# Patient Record
Sex: FEMALE | Race: BLACK OR AFRICAN AMERICAN | NOT HISPANIC OR LATINO | Employment: UNEMPLOYED | URBAN - METROPOLITAN AREA
[De-identification: names, ages, dates, MRNs, and addresses within clinical notes are randomized per-mention and may not be internally consistent; named-entity substitution may affect disease eponyms.]

---

## 2024-01-02 ENCOUNTER — APPOINTMENT (OUTPATIENT)
Dept: ORTHOPEDIC SURGERY | Facility: CLINIC | Age: 59
End: 2024-01-02
Payer: MEDICARE

## 2024-01-02 NOTE — PROGRESS NOTES
CHIEF COMPLAINT: No chief complaint on file.    History: 58 y.o. female presents to the office today for evaluation of her right shoulder.  The patient is right-hand dominant.  The patient is on disability due to chronic leg and back issues.  She does have a history of a cervical fracture and underwent cervical fusion about 2 years ago.  She says she has very limited range of motion in her neck.  Unfortunately, few months ago she started having pain in the right shoulder.  She says that there is pain in the right shoulder but also numbness and tingling that radiates from the neck all the way down to the fingers.  Pain in the shoulder is primarily anterior lateral.  She has had no specific treatment for the shoulder pain yet.  She says that she has had her neck evaluated and states that she had a recent CT scan that showed loosening of the hardware in her neck.  She is not scheduled to see a cervical provider yet.    Past medical history, past surgical history, medications, allergies, family history, social history, and review of systems were reviewed today.    A 12 point review of systems was negative other than as stated in the HPI.    Past Medical History:   Diagnosis Date    Calculus of gallbladder without cholecystitis without obstruction     Gallstones    Morbid (severe) obesity due to excess calories (CMS/[facility]) 02/07/2018    Morbid obesity with BMI of 40.0-44.9, adult        Not on File     Past Surgical History:   Procedure Laterality Date    APPENDECTOMY  03/07/2017    Appendectomy    CHOLECYSTECTOMY  03/07/2017    Cholecystectomy    CT ANGIO NECK  1/31/2021    CT ANGIO NECK 1/31/2021    GASTRIC BYPASS  01/24/2018    Gastric Surgery For Morbid Obesity Bypass With Yasmany-en-Y    MR NECK ANGIO WO IV CONTRAST  2/1/2021    MR NECK ANGIO WO IV CONTRAST 2/1/2021 [facility] CLINICAL LEGACY    OTHER SURGICAL HISTORY  02/23/2021    Cervical vertebral fusion    OTHER SURGICAL HISTORY  01/03/2018    Incision And Drainage Of  "Skin Abscess, Simple    TOTAL KNEE ARTHROPLASTY  03/07/2017    Knee Replacement        No family history on file.     Social History     Socioeconomic History    Marital status:      Spouse name: Not on file    Number of children: Not on file    Years of education: Not on file    Highest education level: Not on file   Occupational History    Not on file   Tobacco Use    Smoking status: Not on file    Smokeless tobacco: Not on file   Substance and Sexual Activity    Alcohol use: Not on file    Drug use: Not on file    Sexual activity: Not on file   Other Topics Concern    Not on file   Social History Narrative    Not on file     Social Determinants of Health     Financial Resource Strain: Not on file   Food Insecurity: Not on file   Transportation Needs: Not on file   Physical Activity: Not on file   Stress: Not on file   Social Connections: Not on file   Intimate Partner Violence: Not on file   Housing Stability: Not on file        CURRENT MEDICATIONS:   No current outpatient medications on file.     No current facility-administered medications for this visit.       Physical Examination:      2/23/2021    11:36 AM 3/23/2021    10:12 AM 8/5/2021     9:14 AM 8/5/2021     9:17 AM 8/18/2021    10:33 AM 2/15/2022     4:23 PM   Vitals   Systolic  133  135 126 144   Diastolic  85  86 79 91   Heart Rate 101 98  86  81   Resp 18  16  89 16   Height (in) 1.575 m (5' 2\") 1.575 m (5' 2\") 1.575 m (5' 2\")   1.575 m (5' 2\")   Weight (lb) 178 190 160   199   BMI 32.56 kg/m2 34.75 kg/m2 29.26 kg/m2   36.4 kg/m2   BSA (m2) 1.88 m2 1.94 m2 1.78 m2   1.99 m2      There is no height or weight on file to calculate BMI.    Well-appearing, appears stated age, pleasant and cooperative, appropriate mood and behavior. Height and weight reviewed. Alert and oriented x3.  Auditory function intact.  No acute distress.  Intact ocular function, MAGNOLIA, EOMI. Breathing is unlabored .  There is no evidence of jugular venous distension. Skin " appearance is normal without evidence of rash or other lesions. 2+ radial pulses bilaterally, fingers pink and wwp, good capillary refill, no pitting edema. No appreciable lymphadenopathy in bilateral upper extremities. SILT throughout both upper extremities, median/radial/ulnar/musculocutaneous/axillary nerve motor and sensory intact (except for abnormalities noted in focused musculoskeletal exam section below).     Neck exam: Tenderness palpation in the paracervical area.  Extremely limited neck range of motion with essentially no rotational ability and very limited flexion and extension.    On exam of bilateral upper extremities, she has very limited range of motion and strength of the right shoulder compared to the left.  On the right side she is active forward flexion to 90, external rotation to 30, internal rotation to the side.  On the left she is active forward flexion 160, external rotation 60, internal rotation to T12.  Passively I can achieve full range of motion but is very painful on the right side.  She does have pain with passive maneuvers of the right shoulder with Neer and Diaz maneuvers.  Significant weakness of rotator cuff strength on the right side, 4-5 strength with resisted supraspinatus and resisted external rotation testing.  She also has weakness in the elbow wrist and fingers on the right side as well and endorses numbness and tingling throughout the extremity.    Imaging: Radiographs of the right shoulder performed today.  Personally interpreted by myself.  She does have chronic changes of the greater tuberosity and otherwise preserved glenohumeral joint space.    Assessment: Right rotator cuff tendinitis, cervical spine pain    Plan: I had a very long discussion the patient with treatment options diagnosis.  Based on my examination today, I do think that she likely has 2 issues that are ongoing.  First, I do think she has true shoulder issues, she has pain with passive maneuvers of  the right shoulder.  We discussed the treatment options of rotator cuff tendinitis.  Will start with a cortisone injection.  In terms of the numbness and tingling and weakness that she has in the entirety of the right upper extremity, this cannot be explained by the shoulder issues.  Deep think that this is more related to her neck.  We will have her set up to see one of our cervical spine specialist.  I did state that the injection for her shoulder replaced therapeutic and diagnostic role.  If it helps a lot, then we can say for more certainty that the majority of her pain is coming from her shoulder.  If the shoulder injection does not help, then most of her pain is likely coming from her neck.    Injection was performed, please see separate procedure note, patient tolerated well.     Patient ID: Caron Hobson is a 58 y.o. female.    L Inj/Asp: R subacromial bursa on 1/3/2024 12:06 PM  Indications: pain  Details: 22 G needle, posterior approach  Medications: 80 mg triamcinolone acetonide 40 mg/mL; 5 mL lidocaine 10 mg/mL (1 %)  Outcome: tolerated well, no immediate complications  Procedure, treatment alternatives, risks and benefits explained, specific risks discussed. Consent was given by the patient. Immediately prior to procedure a time out was called to verify the correct patient, procedure, equipment, support staff and site/side marked as required. Patient was prepped and draped in the usual sterile fashion.           Dragon software was used to dictate this note, please be aware that minor errors in transcription may be present.    Pro Fischer MD    Shoulder/Elbow Surgery  [facility]

## 2024-01-03 ENCOUNTER — OFFICE VISIT (OUTPATIENT)
Dept: ORTHOPEDIC SURGERY | Facility: CLINIC | Age: 59
End: 2024-01-03
Payer: MEDICARE

## 2024-01-03 ENCOUNTER — ANCILLARY PROCEDURE (OUTPATIENT)
Dept: RADIOLOGY | Facility: CLINIC | Age: 59
End: 2024-01-03
Payer: MEDICARE

## 2024-01-03 DIAGNOSIS — M25.511 RIGHT SHOULDER PAIN, UNSPECIFIED CHRONICITY: ICD-10-CM

## 2024-01-03 DIAGNOSIS — M67.911 DISORDER OF RIGHT ROTATOR CUFF: Primary | ICD-10-CM

## 2024-01-03 PROCEDURE — 99214 OFFICE O/P EST MOD 30 MIN: CPT | Performed by: STUDENT IN AN ORGANIZED HEALTH CARE EDUCATION/TRAINING PROGRAM

## 2024-01-03 PROCEDURE — 73030 X-RAY EXAM OF SHOULDER: CPT | Mod: RT

## 2024-01-03 PROCEDURE — 2500000005 HC RX 250 GENERAL PHARMACY W/O HCPCS: Performed by: STUDENT IN AN ORGANIZED HEALTH CARE EDUCATION/TRAINING PROGRAM

## 2024-01-03 PROCEDURE — 73030 X-RAY EXAM OF SHOULDER: CPT | Mod: RIGHT SIDE | Performed by: RADIOLOGY

## 2024-01-03 PROCEDURE — 20610 DRAIN/INJ JOINT/BURSA W/O US: CPT | Performed by: STUDENT IN AN ORGANIZED HEALTH CARE EDUCATION/TRAINING PROGRAM

## 2024-01-03 PROCEDURE — 2500000004 HC RX 250 GENERAL PHARMACY W/ HCPCS (ALT 636 FOR OP/ED): Performed by: STUDENT IN AN ORGANIZED HEALTH CARE EDUCATION/TRAINING PROGRAM

## 2024-01-03 PROCEDURE — 99204 OFFICE O/P NEW MOD 45 MIN: CPT | Performed by: STUDENT IN AN ORGANIZED HEALTH CARE EDUCATION/TRAINING PROGRAM

## 2024-01-03 RX ORDER — TRIAMCINOLONE ACETONIDE 40 MG/ML
80 INJECTION, SUSPENSION INTRA-ARTICULAR; INTRAMUSCULAR
Status: COMPLETED | OUTPATIENT
Start: 2024-01-03 | End: 2024-01-03

## 2024-01-03 RX ORDER — LIDOCAINE HYDROCHLORIDE 10 MG/ML
5 INJECTION INFILTRATION; PERINEURAL
Status: COMPLETED | OUTPATIENT
Start: 2024-01-03 | End: 2024-01-03

## 2024-01-03 RX ADMIN — TRIAMCINOLONE ACETONIDE 80 MG: 400 INJECTION, SUSPENSION INTRA-ARTICULAR; INTRAMUSCULAR at 12:06

## 2024-01-03 RX ADMIN — LIDOCAINE HYDROCHLORIDE 5 ML: 10 INJECTION, SOLUTION INFILTRATION; PERINEURAL at 12:06

## 2024-01-11 ENCOUNTER — OFFICE VISIT (OUTPATIENT)
Dept: ORTHOPEDIC SURGERY | Facility: CLINIC | Age: 59
End: 2024-01-11
Payer: MEDICARE

## 2024-01-11 DIAGNOSIS — M54.12 CERVICAL MYELOPATHY WITH CERVICAL RADICULOPATHY (MULTI): Primary | ICD-10-CM

## 2024-01-11 DIAGNOSIS — G95.9 CERVICAL MYELOPATHY WITH CERVICAL RADICULOPATHY (MULTI): Primary | ICD-10-CM

## 2024-01-11 PROCEDURE — 1036F TOBACCO NON-USER: CPT | Performed by: PHYSICIAN ASSISTANT

## 2024-01-11 PROCEDURE — 99214 OFFICE O/P EST MOD 30 MIN: CPT | Mod: ZK | Performed by: PHYSICIAN ASSISTANT

## 2024-01-11 PROCEDURE — 99214 OFFICE O/P EST MOD 30 MIN: CPT | Performed by: PHYSICIAN ASSISTANT

## 2024-01-11 RX ORDER — SEMAGLUTIDE 0.68 MG/ML
INJECTION, SOLUTION SUBCUTANEOUS
COMMUNITY
Start: 2023-11-29

## 2024-01-11 RX ORDER — FLUTICASONE PROPIONATE AND SALMETEROL 50; 250 UG/1; UG/1
POWDER RESPIRATORY (INHALATION)
COMMUNITY
Start: 2022-08-30

## 2024-01-11 RX ORDER — ASPIRIN 325 MG
TABLET, DELAYED RELEASE (ENTERIC COATED) ORAL
COMMUNITY
Start: 2017-08-01

## 2024-01-11 RX ORDER — ALBUTEROL SULFATE 90 UG/1
AEROSOL, METERED RESPIRATORY (INHALATION)
COMMUNITY

## 2024-01-11 ASSESSMENT — PAIN SCALES - GENERAL: PAINLEVEL_OUTOF10: 9

## 2024-01-11 ASSESSMENT — PAIN - FUNCTIONAL ASSESSMENT: PAIN_FUNCTIONAL_ASSESSMENT: 0-10
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
## 2024-01-15 ENCOUNTER — HOSPITAL ENCOUNTER (OUTPATIENT)
Dept: RADIOLOGY | Facility: HOSPITAL | Age: 59
Discharge: HOME | End: 2024-01-15
Payer: MEDICARE

## 2024-01-15 DIAGNOSIS — M54.12 CERVICAL MYELOPATHY WITH CERVICAL RADICULOPATHY (MULTI): ICD-10-CM

## 2024-01-15 DIAGNOSIS — G95.9 CERVICAL MYELOPATHY WITH CERVICAL RADICULOPATHY (MULTI): ICD-10-CM

## 2024-01-15 PROCEDURE — 72141 MRI NECK SPINE W/O DYE: CPT | Performed by: RADIOLOGY

## 2024-01-15 PROCEDURE — 72141 MRI NECK SPINE W/O DYE: CPT

## 2024-01-22 ENCOUNTER — HOSPITAL ENCOUNTER (OUTPATIENT)
Dept: RADIOLOGY | Facility: CLINIC | Age: 59
Discharge: HOME | End: 2024-01-22
Payer: MEDICARE

## 2024-01-22 ENCOUNTER — APPOINTMENT (OUTPATIENT)
Dept: ORTHOPEDIC SURGERY | Facility: CLINIC | Age: 59
End: 2024-01-22
Payer: COMMERCIAL

## 2024-01-22 ENCOUNTER — OFFICE VISIT (OUTPATIENT)
Dept: ORTHOPEDIC SURGERY | Facility: CLINIC | Age: 59
End: 2024-01-22
Payer: MEDICARE

## 2024-01-22 VITALS — HEIGHT: 61 IN | BODY MASS INDEX: 35.87 KG/M2 | WEIGHT: 190 LBS

## 2024-01-22 DIAGNOSIS — G95.9 CERVICAL MYELOPATHY WITH CERVICAL RADICULOPATHY (MULTI): ICD-10-CM

## 2024-01-22 DIAGNOSIS — M54.12 CERVICAL MYELOPATHY WITH CERVICAL RADICULOPATHY (MULTI): ICD-10-CM

## 2024-01-22 DIAGNOSIS — M54.12 CERVICAL RADICULOPATHY: ICD-10-CM

## 2024-01-22 PROCEDURE — 72050 X-RAY EXAM NECK SPINE 4/5VWS: CPT

## 2024-01-22 PROCEDURE — 72050 X-RAY EXAM NECK SPINE 4/5VWS: CPT | Performed by: RADIOLOGY

## 2024-01-22 PROCEDURE — 99214 OFFICE O/P EST MOD 30 MIN: CPT

## 2024-01-22 PROCEDURE — 1036F TOBACCO NON-USER: CPT

## 2024-01-22 RX ORDER — PREDNISONE 20 MG/1
TABLET ORAL
Qty: 30 TABLET | Refills: 0 | Status: SHIPPED | OUTPATIENT
Start: 2024-01-22 | End: 2024-02-06

## 2024-01-22 RX ORDER — GABAPENTIN 300 MG/1
300 CAPSULE ORAL 2 TIMES DAILY
Qty: 60 CAPSULE | Refills: 2 | Status: SHIPPED | OUTPATIENT
Start: 2024-01-22 | End: 2024-04-21

## 2024-01-22 ASSESSMENT — PAIN SCALES - GENERAL: PAINLEVEL: 10-WORST PAIN EVER
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
## 2024-01-25 ENCOUNTER — OFFICE VISIT (OUTPATIENT)
Dept: PAIN MEDICINE | Facility: HOSPITAL | Age: 59
End: 2024-01-25
Payer: MEDICARE

## 2024-01-25 DIAGNOSIS — M54.12 CERVICAL MYELOPATHY WITH CERVICAL RADICULOPATHY (MULTI): ICD-10-CM

## 2024-01-25 DIAGNOSIS — G95.9 CERVICAL MYELOPATHY WITH CERVICAL RADICULOPATHY (MULTI): ICD-10-CM

## 2024-01-25 PROCEDURE — 99214 OFFICE O/P EST MOD 30 MIN: CPT | Performed by: ANESTHESIOLOGY

## 2024-01-25 PROCEDURE — 1036F TOBACCO NON-USER: CPT | Performed by: ANESTHESIOLOGY

## 2024-01-25 ASSESSMENT — PAIN SCALES - GENERAL: PAINLEVEL: 8

## 2024-01-25 NOTE — PROGRESS NOTES
"Chief Complaint   Patient presents with    Arm Pain     Subjective   Patient ID: Caron Hobson is a 58 y.o. female who presents for No chief complaint on file..    History of present illness:  58-year-old female with a PMH of  cervical fusion C1-C4 in 2021. Presents after fall in November causing sharp/radiating R arm/shoulder pain. Pain began a few days following fall and has progressively worsened. Pain begins in shoulder and radiates down arm into fingers. The patient is unable to localize pain any further stating it's her entire arm and shoulder. She has no left sided symptoms. She has right sided neck \"tightness\", but this is chronic for her and has had no recent changes. Neither neck or shoulder are painful to palpation. Her symptoms are worse at night and with any movement. Unable to extend arm behind back 2/2 pain. Pain is associated with occasional numbness and tingling. Patient also endorses weakness and dropping objects. 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     Patient was recently evaluated by ortho for the same complaint. At that time, they recommended PT and prescribed gabapentin and prednisone. She has also received a subacromial bursa injection, which offered no relief.  She has been doing exercises directed at her neck, shoulder and arm several times a week which are physician directed for the past 1 month.      MRI C spine 1/2024: Minimal anterolisthesis is noted at C6-7 and C7-T1. C4-5: Disc bulge minimally indents the thecal sac more so than noted previously. C5-6: The spinal canal is moderately stenosed centrally by disc protrusion      Review of Systems   13 point ROS negative except as mentioned in HPI.     Current Outpatient Medications   Medication Instructions    Advair Diskus 250-50 mcg/dose diskus inhaler     albuterol 90 mcg/actuation inhaler     cholecalciferol (Vitamin D-3) 50,000 unit capsule oral    gabapentin (NEURONTIN) 300 " mg, oral, 2 times daily    Ozempic 0.25 mg or 0.5 mg (2 mg/3 mL) pen injector INJECT 0.25MG UNDER THE SKIN ONCE WEEKLY    predniSONE (Deltasone) 20 mg tablet Take 1 tablet (20 mg) by mouth 3 times a day for 5 days, THEN 1 tablet (20 mg) 2 times a day for 5 days, THEN 1 tablet (20 mg) once daily for 5 days.        Past Medical History:   Diagnosis Date    Calculus of gallbladder without cholecystitis without obstruction     Gallstones    Morbid (severe) obesity due to excess calories (CMS/[facility]) 02/07/2018    Morbid obesity with BMI of 40.0-44.9, adult        Past Surgical History:   Procedure Laterality Date    APPENDECTOMY  03/07/2017    Appendectomy    CHOLECYSTECTOMY  03/07/2017    Cholecystectomy    CT ANGIO NECK  1/31/2021    CT ANGIO NECK 1/31/2021    GASTRIC BYPASS  01/24/2018    Gastric Surgery For Morbid Obesity Bypass With Yasmany-en-Y    MR NECK ANGIO WO IV CONTRAST  2/1/2021    MR NECK ANGIO WO IV CONTRAST 2/1/2021 [facility] CLINICAL LEGACY    OTHER SURGICAL HISTORY  02/23/2021    Cervical vertebral fusion    OTHER SURGICAL HISTORY  01/03/2018    Incision And Drainage Of Skin Abscess, Simple    TOTAL KNEE ARTHROPLASTY  03/07/2017    Knee Replacement        No family history on file.     Allergies   Allergen Reactions    Coconut GI Upset and Hives        Objective     Physical Exam  GENERAL EXAM  General Appearance:  Awake, alert, healthy appearing, well developed, No acute distress.  Head: Normocephalic without evidence of head injury.  Neck: The appearance of the neck was normal without swelling with a midline trachea.  Eyes: The eyelids and eyebrows exhibited no abnormalities.  Pupils were not pin-point.  Sclera was without icterus.  Lungs: Respiration rhythm and depth was normal.  Respiratory movements were normal without labored breathing.  Cardiovascular: No peripheral edema was present.    Neurological: Patient was oriented to time, place, and person.  Speech was normal.  Balance, gait, and stance were  unremarkable.  3+ patellar, biceps, brachioradialis reflexes BL. Gucci neg.   Ext: R: 4/5  strength, shoulder & elbow flexion, extension. L: 5/5  strength, shoulder & elbow flexion, extension.  Psychiatric: Appearance was normal with appropriate dress.  Mood was euthymic and affect was normal.  Skin: Affected regions were without ecchymosis or skin lesions.      Assessment/Plan   57 y/o F with PMH of cervical fusion C1-C4 in 2021 with cervical radiculitis occurring after fall in November. Pain radiates from shoulder, down arm, into all fingers. Associated with numbness, tingling, and weakness, causing patient difficulty in completing ADLs. Physical exam significant for decreased strength in RUE compared to left, weakness in dominant side. MRI notable degenerative changes in the cervical spine most notably at C4-5 and C5-6 with moderate central stenosis.  Patient has tried gabapentin, prednisone, physician directed exercises x 1 month for her neck/shoulder/arm, and a subacromial bursa injection with no relief.  Further formal PT is ordered for February.    Symptoms are severe and making it unable for the patient to work.    Plan  - Plan for C6-7 cervical CHARLEY. Procedure including risk, benefits and side effects were explained to pt who was agreeable to proceed.   - Continue gabapentin 300 mg BID.  Side effect profile and risk reviewed.  - Continue PT.

## 2024-01-30 ENCOUNTER — HOSPITAL ENCOUNTER (EMERGENCY)
Facility: HOSPITAL | Age: 59
Discharge: HOME | End: 2024-01-30
Attending: EMERGENCY MEDICINE
Payer: MEDICARE

## 2024-01-30 ENCOUNTER — APPOINTMENT (OUTPATIENT)
Dept: RADIOLOGY | Facility: HOSPITAL | Age: 59
End: 2024-01-30
Payer: MEDICARE

## 2024-01-30 VITALS
WEIGHT: 190 LBS | SYSTOLIC BLOOD PRESSURE: 123 MMHG | TEMPERATURE: 98.1 F | OXYGEN SATURATION: 98 % | HEIGHT: 61 IN | DIASTOLIC BLOOD PRESSURE: 83 MMHG | BODY MASS INDEX: 35.87 KG/M2 | RESPIRATION RATE: 18 BRPM | HEART RATE: 85 BPM

## 2024-01-30 DIAGNOSIS — M25.511 CHRONIC RIGHT SHOULDER PAIN: Primary | ICD-10-CM

## 2024-01-30 DIAGNOSIS — G89.29 CHRONIC RIGHT SHOULDER PAIN: Primary | ICD-10-CM

## 2024-01-30 PROCEDURE — 99283 EMERGENCY DEPT VISIT LOW MDM: CPT | Mod: 25

## 2024-01-30 PROCEDURE — 99284 EMERGENCY DEPT VISIT MOD MDM: CPT | Performed by: EMERGENCY MEDICINE

## 2024-01-30 PROCEDURE — 73030 X-RAY EXAM OF SHOULDER: CPT | Mod: RIGHT SIDE | Performed by: RADIOLOGY

## 2024-01-30 PROCEDURE — 96372 THER/PROPH/DIAG INJ SC/IM: CPT

## 2024-01-30 PROCEDURE — 2500000004 HC RX 250 GENERAL PHARMACY W/ HCPCS (ALT 636 FOR OP/ED): Mod: SE

## 2024-01-30 PROCEDURE — 73030 X-RAY EXAM OF SHOULDER: CPT | Mod: RT

## 2024-01-30 PROCEDURE — 2500000001 HC RX 250 WO HCPCS SELF ADMINISTERED DRUGS (ALT 637 FOR MEDICARE OP): Mod: SE

## 2024-01-30 PROCEDURE — 2500000005 HC RX 250 GENERAL PHARMACY W/O HCPCS: Mod: SE

## 2024-01-30 RX ORDER — LIDOCAINE 50 MG/G
1 PATCH TOPICAL DAILY
Qty: 10 PATCH | Refills: 0 | Status: SHIPPED | OUTPATIENT
Start: 2024-01-30

## 2024-01-30 RX ORDER — LIDOCAINE 560 MG/1
1 PATCH PERCUTANEOUS; TOPICAL; TRANSDERMAL DAILY
Status: DISCONTINUED | OUTPATIENT
Start: 2024-01-30 | End: 2024-01-30 | Stop reason: HOSPADM

## 2024-01-30 RX ORDER — CYCLOBENZAPRINE HCL 5 MG
5 TABLET ORAL 3 TIMES DAILY
Qty: 30 TABLET | Refills: 0 | Status: SHIPPED | OUTPATIENT
Start: 2024-01-30 | End: 2024-02-09

## 2024-01-30 RX ORDER — IBUPROFEN 600 MG/1
600 TABLET ORAL EVERY 6 HOURS PRN
Qty: 28 TABLET | Refills: 0 | Status: SHIPPED | OUTPATIENT
Start: 2024-01-30 | End: 2024-02-06

## 2024-01-30 RX ORDER — ACETAMINOPHEN 325 MG/1
975 TABLET ORAL ONCE
Status: COMPLETED | OUTPATIENT
Start: 2024-01-30 | End: 2024-01-30

## 2024-01-30 RX ORDER — CYCLOBENZAPRINE HCL 10 MG
5 TABLET ORAL ONCE
Status: COMPLETED | OUTPATIENT
Start: 2024-01-30 | End: 2024-01-30

## 2024-01-30 RX ORDER — KETOROLAC TROMETHAMINE 30 MG/ML
30 INJECTION, SOLUTION INTRAMUSCULAR; INTRAVENOUS ONCE
Status: COMPLETED | OUTPATIENT
Start: 2024-01-30 | End: 2024-01-30

## 2024-01-30 RX ORDER — ACETAMINOPHEN 325 MG/1
650 TABLET ORAL EVERY 6 HOURS PRN
Qty: 30 TABLET | Refills: 0 | Status: SHIPPED | OUTPATIENT
Start: 2024-01-30 | End: 2024-02-09

## 2024-01-30 RX ADMIN — KETOROLAC TROMETHAMINE 30 MG: 30 INJECTION INTRAMUSCULAR; INTRAVENOUS at 19:27

## 2024-01-30 RX ADMIN — LIDOCAINE 1 PATCH: 4 PATCH TOPICAL at 19:27

## 2024-01-30 RX ADMIN — CYCLOBENZAPRINE 5 MG: 10 TABLET, FILM COATED ORAL at 19:28

## 2024-01-30 RX ADMIN — ACETAMINOPHEN 975 MG: 325 TABLET ORAL at 19:27

## 2024-01-30 ASSESSMENT — PAIN DESCRIPTION - PAIN TYPE: TYPE: CHRONIC PAIN;ACUTE PAIN

## 2024-01-30 ASSESSMENT — COLUMBIA-SUICIDE SEVERITY RATING SCALE - C-SSRS
2. HAVE YOU ACTUALLY HAD ANY THOUGHTS OF KILLING YOURSELF?: NO
6. HAVE YOU EVER DONE ANYTHING, STARTED TO DO ANYTHING, OR PREPARED TO DO ANYTHING TO END YOUR LIFE?: NO
1. IN THE PAST MONTH, HAVE YOU WISHED YOU WERE DEAD OR WISHED YOU COULD GO TO SLEEP AND NOT WAKE UP?: NO

## 2024-01-30 ASSESSMENT — PAIN DESCRIPTION - ORIENTATION: ORIENTATION: RIGHT

## 2024-01-30 ASSESSMENT — PAIN - FUNCTIONAL ASSESSMENT
PAIN_FUNCTIONAL_ASSESSMENT: 0-10
PAIN_FUNCTIONAL_ASSESSMENT: 0-10

## 2024-01-30 ASSESSMENT — PAIN SCALES - GENERAL
PAINLEVEL_OUTOF10: 10 - WORST POSSIBLE PAIN
PAINLEVEL_OUTOF10: 3

## 2024-01-30 ASSESSMENT — PAIN DESCRIPTION - LOCATION: LOCATION: SHOULDER

## 2024-01-30 NOTE — Clinical Note
Caron Hobson was seen and treated in our emergency department on 1/30/2024.  She may return to work on 02/01/2024.       If you have any questions or concerns, please don't hesitate to call.      Mounika Serna, DO

## 2024-01-30 NOTE — ED TRIAGE NOTES
Patient has chronic pain in her right shoulder and is due to get an injection for it on the 9th but today she felt like she heard something pop in her right shoulder when she was moving tables clothes from one hand to the other.

## 2024-01-31 ENCOUNTER — APPOINTMENT (OUTPATIENT)
Dept: ORTHOPEDIC SURGERY | Facility: CLINIC | Age: 59
End: 2024-01-31
Payer: COMMERCIAL

## 2024-01-31 NOTE — DISCHARGE INSTRUCTIONS
You presented with acute on chronic worsening of your right shoulder pain.  Your functionality of your shoulder appears to be at baseline.  We did obtain x-ray imaging to rule out any acute injury today.  You were treated with a multimodal pain approach.  Ultimately you have follow-up with physical therapy, sports medicine, orthopedic surgery and pain management.  It is vital that you follow-up as scheduled and stick to your multimodal pain management plan.  Please return if you experience any progressive pain, fever, chills or worsening numbness weakness of your hand or arm.

## 2024-01-31 NOTE — ED PROVIDER NOTES
HPI   No chief complaint on file.      58-year-old female history of cervical fusion C1-C4 (2021) with fall in November causing worsening radiculopathy symptoms presenting today with acute on chronic right shoulder pain.  Patient states that she was folding some clothes when she reached across her body with her right arm to her left and felt a pop with immediate worsening pain in her shoulder.  Patient states that since November she has had chronic right shoulder pain and is followed with orthopedics and pain management.  She was recently seen earlier this week and prescribed a steroid burst, notes that her symptoms have been ongoing but tonight she is in excruciating pain which prompted her presentation.  Pain begins in the shoulder, intermittently radiates into her arm and fingers, patient states that radiculopathy symptoms are baseline for her.  Endorses her chronic neck tightness but no new complaints.  Endorses occasional numbness and tingling but no change from baseline.  She has failed subacromial injections, has upcoming appointment with pain management for injection guided therapy.  No falls or additional trauma.                          [place] Coma Scale Score: 15                  Patient History   Past Medical History:   Diagnosis Date   • Calculus of gallbladder without cholecystitis without obstruction     Gallstones   • Morbid (severe) obesity due to excess calories (CMS/[facility]) 02/07/2018    Morbid obesity with BMI of 40.0-44.9, adult     Past Surgical History:   Procedure Laterality Date   • APPENDECTOMY  03/07/2017    Appendectomy   • CHOLECYSTECTOMY  03/07/2017    Cholecystectomy   • CT ANGIO NECK  1/31/2021    CT ANGIO NECK 1/31/2021   • GASTRIC BYPASS  01/24/2018    Gastric Surgery For Morbid Obesity Bypass With Yasmany-en-Y   • MR NECK ANGIO WO IV CONTRAST  2/1/2021    MR NECK ANGIO WO IV CONTRAST 2/1/2021 [facility] CLINICAL LEGACY   • OTHER SURGICAL HISTORY  02/23/2021    Cervical vertebral fusion   •  OTHER SURGICAL HISTORY  01/03/2018    Incision And Drainage Of Skin Abscess, Simple   • TOTAL KNEE ARTHROPLASTY  03/07/2017    Knee Replacement     No family history on file.  Social History     Tobacco Use   • Smoking status: Never   • Smokeless tobacco: Never   Substance Use Topics   • Alcohol use: Never   • Drug use: Never       Physical Exam   ED Triage Vitals [01/30/24 1654]   Temperature Heart Rate Respirations BP   36.7 °C (98.1 °F) 85 18 123/83      Pulse Ox Temp src Heart Rate Source Patient Position   98 % -- -- --      BP Location FiO2 (%)     -- 21 %       Physical Exam  Vitals and nursing note reviewed.   Constitutional:       General: She is not in acute distress.     Appearance: Normal appearance. She is well-developed. She is not ill-appearing, toxic-appearing or diaphoretic.   HENT:      Head: Normocephalic and atraumatic.   Eyes:      Conjunctiva/sclera: Conjunctivae normal.   Cardiovascular:      Rate and Rhythm: Normal rate and regular rhythm.      Heart sounds: No murmur heard.  Pulmonary:      Effort: Pulmonary effort is normal. No respiratory distress.      Breath sounds: Normal breath sounds.   Abdominal:      Palpations: Abdomen is soft.      Tenderness: There is no abdominal tenderness.   Musculoskeletal:         General: No swelling.      Right shoulder: Tenderness present. No swelling or deformity. Decreased range of motion. Normal pulse.      Left shoulder: Normal.      Cervical back: Neck supple.      Comments: Limited range of motion of right shoulder compared to left, active forward flexion to approximately 90 degrees there is patient has almost full range of motion to the left shoulder.  Pain with passive maneuvers however able to fully range right shoulder passively.  Pain with Neer and Diaz maneuvers.  4 out of 5 strength on the right side with abduction, flexion.  Sensation grossly intact.  Radial pulses 2+ bilaterally.   Skin:     General: Skin is warm and dry.      Capillary  Refill: Capillary refill takes less than 2 seconds.   Neurological:      General: No focal deficit present.      Mental Status: She is alert.   Psychiatric:         Mood and Affect: Mood normal.         ED Course & MDM   ED Course as of 01/30/24 2101   Tue Jan 30, 2024 2030 XR shoulder right 2+ views  Reviewed x-ray right shoulder, no evidence of acute fracture or dislocation.  No acute bony abnormalities. [KR]      ED Course User Index  [KR] Mounika Serna DO         Diagnoses as of 01/30/24 2101   Chronic right shoulder pain       Medical Decision Making  58-year-old female history of cervical fusion presenting with acute on chronic right shoulder pain after folding clothes today.  Patient is chronically followed by orthopedics as well as pain management for her chronic ongoing shoulder pain after a remote injury however had acute onset worsening pain tonight.  Overall functionality of the shoulder is at baseline, patient has no significant midline C-spine tenderness or significant radiculopathy symptoms concerning for cord pathology.  Low suspicion for fracture or dislocation, suspect acute on chronic worsening of patient's arthritis versus rotator cuff impingement/injury.  No tenderness within the biceps tendon or pectoral region to suggest musculoskeletal injury although did consider muscle strain.  Afebrile, no clinical sx of joint infection. Given acute worsening of pain we will obtain an x-ray today to rule out bony abnormalities/joint integrity abnormality.  Will treat with multimodal pain approach including Tylenol, Toradol, Flexeril and lidocaine patch.  Reviewed most recent MRI of the cervical spine from 1/15/2024 which shows chronic degenerative changes of the C5-6 disc space. Ultimately patient has appropriate follow-up with pain management, sports medicine and orthopedics and has PT/OT scheduled as well as an upcoming injection.  We discussed further conservative management of her symptoms and  patient was discharged home with return precautions discussed.        Procedure  Procedures     Mounika Serna,   Resident  01/30/24 1570

## 2024-02-05 ENCOUNTER — EVALUATION (OUTPATIENT)
Dept: PHYSICAL THERAPY | Facility: CLINIC | Age: 59
End: 2024-02-05
Payer: MEDICARE

## 2024-02-05 DIAGNOSIS — M43.6 NECK STIFFNESS: ICD-10-CM

## 2024-02-05 DIAGNOSIS — M25.511 CHRONIC RIGHT SHOULDER PAIN: Primary | ICD-10-CM

## 2024-02-05 DIAGNOSIS — G89.29 CHRONIC RIGHT SHOULDER PAIN: Primary | ICD-10-CM

## 2024-02-05 DIAGNOSIS — M54.12 CERVICAL RADICULOPATHY: ICD-10-CM

## 2024-02-05 PROCEDURE — 97161 PT EVAL LOW COMPLEX 20 MIN: CPT | Mod: GP | Performed by: PHYSICAL THERAPIST

## 2024-02-05 PROCEDURE — 97110 THERAPEUTIC EXERCISES: CPT | Mod: GP | Performed by: PHYSICAL THERAPIST

## 2024-02-05 ASSESSMENT — PAIN DESCRIPTION - DESCRIPTORS: DESCRIPTORS: BURNING

## 2024-02-05 ASSESSMENT — ENCOUNTER SYMPTOMS
OCCASIONAL FEELINGS OF UNSTEADINESS: 0
LOSS OF SENSATION IN FEET: 0
DEPRESSION: 0

## 2024-02-05 ASSESSMENT — PAIN SCALES - GENERAL: PAINLEVEL_OUTOF10: 9

## 2024-02-05 ASSESSMENT — PAIN - FUNCTIONAL ASSESSMENT: PAIN_FUNCTIONAL_ASSESSMENT: 0-10
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
## 2024-02-06 VITALS — HEART RATE: 77 BPM

## 2024-02-09 ENCOUNTER — HOSPITAL ENCOUNTER (OUTPATIENT)
Dept: RADIOLOGY | Facility: HOSPITAL | Age: 59
Discharge: HOME | End: 2024-02-09
Payer: MEDICARE

## 2024-02-09 VITALS
TEMPERATURE: 98.1 F | DIASTOLIC BLOOD PRESSURE: 72 MMHG | HEART RATE: 98 BPM | OXYGEN SATURATION: 100 % | RESPIRATION RATE: 16 BRPM | SYSTOLIC BLOOD PRESSURE: 123 MMHG

## 2024-02-09 DIAGNOSIS — M54.12 CERVICAL RADICULOPATHY: ICD-10-CM

## 2024-02-09 DIAGNOSIS — M54.12 CERVICAL MYELOPATHY WITH CERVICAL RADICULOPATHY (MULTI): ICD-10-CM

## 2024-02-09 DIAGNOSIS — G95.9 CERVICAL MYELOPATHY WITH CERVICAL RADICULOPATHY (MULTI): ICD-10-CM

## 2024-02-09 PROCEDURE — 77003 FLUOROGUIDE FOR SPINE INJECT: CPT

## 2024-02-09 PROCEDURE — 2500000004 HC RX 250 GENERAL PHARMACY W/ HCPCS (ALT 636 FOR OP/ED): Performed by: ANESTHESIOLOGY

## 2024-02-09 PROCEDURE — 62321 NJX INTERLAMINAR CRV/THRC: CPT

## 2024-02-09 PROCEDURE — 62321 NJX INTERLAMINAR CRV/THRC: CPT | Performed by: ANESTHESIOLOGY

## 2024-02-09 RX ORDER — MIDAZOLAM HYDROCHLORIDE 1 MG/ML
INJECTION INTRAMUSCULAR; INTRAVENOUS
Status: COMPLETED | OUTPATIENT
Start: 2024-02-09 | End: 2024-02-09

## 2024-02-09 RX ADMIN — MIDAZOLAM HYDROCHLORIDE 2 MG: 1 INJECTION INTRAMUSCULAR; INTRAVENOUS at 10:05

## 2024-02-09 ASSESSMENT — PAIN - FUNCTIONAL ASSESSMENT
PAIN_FUNCTIONAL_ASSESSMENT: 0-10

## 2024-02-09 ASSESSMENT — PAIN SCALES - GENERAL
PAINLEVEL_OUTOF10: 0 - NO PAIN
PAINLEVEL_OUTOF10: 10 - WORST POSSIBLE PAIN
PAINLEVEL_OUTOF10: 5 - MODERATE PAIN
PAINLEVEL_OUTOF10: 0 - NO PAIN
PAINLEVEL_OUTOF10: 3
PAINLEVEL_OUTOF10: 3

## 2024-02-09 NOTE — PROCEDURES
Preoperative diagnosis:  Cervical radiculitis  Postoperative diagnosis:  Cervical radiculitis, cervical stenosis, prior cervical surgery  Procedure: C6-7 cervical epidural steroid injection under fluoroscopic guidance  Surgeon: Irma Ratliff  Assistant:  Fellow, Damon Hurley  Anesthesia: Local  Complications: Apparently none    Clinical note: Ms. Hobson is a 58-year-old female with a history of neck pain and upper extremity radicular symptoms, right-sided greater than left who presents today for the aforementioned procedure.    Procedure note: The patient was met in the preoperative holding area after risks benefits and alternatives to procedure were discussed with the patient, informed consent was obtained. Patient brought back to the procedure room and placed in the prone position on the fluoroscopy table. Area over the neck was exposed, prepped, draped, in the usual sterile fashion.  Skin and subcutaneous tissues to the cervical intralaminar space was anesthetized using 0.5% lidocaine.  An 18-gauge Tuohy needle was inserted in the skin and advanced into the interlaminar space. A glass syringe was used to achieve the epidural space using the loss resistance technique. Contrast was injected which showed appropriate epidural spread, no intravascular or intrathecal uptake. A total of 1 mL's of 0.5% lidocaine mixed with 40 mg methylprednisolone was injected. Needle removed, bandage applied, patient tolerated the procedure well with no immediate complications.

## 2024-02-09 NOTE — H&P
History Of Present Illness  Caron Hobson is a 58 y.o. female presenting with cervical radiculopathy.     Past Medical History  Past Medical History:   Diagnosis Date    Calculus of gallbladder without cholecystitis without obstruction     Gallstones    Morbid (severe) obesity due to excess calories (CMS/[facility]) 02/07/2018    Morbid obesity with BMI of 40.0-44.9, adult       Surgical History  Past Surgical History:   Procedure Laterality Date    APPENDECTOMY  03/07/2017    Appendectomy    CHOLECYSTECTOMY  03/07/2017    Cholecystectomy    CT ANGIO NECK  1/31/2021    CT ANGIO NECK 1/31/2021    GASTRIC BYPASS  01/24/2018    Gastric Surgery For Morbid Obesity Bypass With Yasmany-en-Y    MR NECK ANGIO WO IV CONTRAST  2/1/2021    MR NECK ANGIO WO IV CONTRAST 2/1/2021 [facility] CLINICAL LEGACY    OTHER SURGICAL HISTORY  02/23/2021    Cervical vertebral fusion    OTHER SURGICAL HISTORY  01/03/2018    Incision And Drainage Of Skin Abscess, Simple    TOTAL KNEE ARTHROPLASTY  03/07/2017    Knee Replacement        Social History  She reports that she has never smoked. She has never used smokeless tobacco. She reports that she does not drink alcohol and does not use drugs.    Family History  No family history on file.     Allergies  Coconut    Review of Systems   13 point ROS done and negative except for the above.   Physical Exam     General: NAD, well nourished   Eyes: Non-icteric sclera, EOMI  Ears, Nose, Mouth, and Throat: External ears and nose appear to be without deformity or rash. No lesions or masses noted. Hearing is grossly intact.   Neck: Trachea midline  Respiratory: Nonlabored breathing   Cardiovascular: No JVD  Skin: No rashes or open lesions/ulcers identified on skin.    Last Recorded Vitals  There were no vitals taken for this visit.    Relevant Results           Assessment/Plan   Problem List Items Addressed This Visit             ICD-10-CM       Neuro    Cervical myelopathy with cervical radiculopathy (CMS/HCC) G95.9,  M54.12    Relevant Orders    FL pain management TC    Epidural Steroid Injection     Other Visit Diagnoses         Codes    Cervical radiculopathy     M54.12    Relevant Orders    Referral to Pain Medicine            MRI notable degenerative changes in the cervical spine most notably at C4-5 and C5-6 with moderate central stenosis.  Plan for C6/C7 cervical interlaminar epidural steroid injection    Risks, benefits, alternatives to the procedure were discussed in detail and patient was amenable to proceeding.    Rey Hurley MD

## 2024-02-20 ENCOUNTER — DOCUMENTATION (OUTPATIENT)
Dept: PHYSICAL THERAPY | Facility: CLINIC | Age: 59
End: 2024-02-20
Payer: MEDICARE

## 2024-02-20 NOTE — PROGRESS NOTES
Pt. No-called/no-showed to today's appointment.  PT called patient today at 11:15am and discussed our cancellation/no-show policy and a 2nd no-show will lead to discharge.  Reminded pt. Of her next appointment time/date.

## 2024-02-27 ENCOUNTER — TREATMENT (OUTPATIENT)
Dept: PHYSICAL THERAPY | Facility: CLINIC | Age: 59
End: 2024-02-27
Payer: MEDICARE

## 2024-02-27 DIAGNOSIS — M43.6 NECK STIFFNESS: ICD-10-CM

## 2024-02-27 DIAGNOSIS — M25.511 CHRONIC RIGHT SHOULDER PAIN: ICD-10-CM

## 2024-02-27 DIAGNOSIS — G89.29 CHRONIC RIGHT SHOULDER PAIN: ICD-10-CM

## 2024-02-27 DIAGNOSIS — M54.12 CERVICAL RADICULOPATHY: ICD-10-CM

## 2024-02-27 PROCEDURE — 97110 THERAPEUTIC EXERCISES: CPT | Mod: GP | Performed by: PHYSICAL THERAPIST

## 2024-02-27 ASSESSMENT — PAIN - FUNCTIONAL ASSESSMENT: PAIN_FUNCTIONAL_ASSESSMENT: 0-10

## 2024-02-27 ASSESSMENT — PAIN SCALES - GENERAL: PAINLEVEL_OUTOF10: 7

## 2024-02-29 ENCOUNTER — APPOINTMENT (OUTPATIENT)
Dept: PHYSICAL THERAPY | Facility: CLINIC | Age: 59
End: 2024-02-29
Payer: MEDICARE

## 2024-03-05 ENCOUNTER — DOCUMENTATION (OUTPATIENT)
Dept: PHYSICAL THERAPY | Facility: CLINIC | Age: 59
End: 2024-03-05
Payer: MEDICARE

## 2024-03-05 ENCOUNTER — APPOINTMENT (OUTPATIENT)
Dept: PHYSICAL THERAPY | Facility: CLINIC | Age: 59
End: 2024-03-05
Payer: MEDICARE

## 2024-03-05 ENCOUNTER — APPOINTMENT (OUTPATIENT)
Dept: RADIOLOGY | Facility: CLINIC | Age: 59
End: 2024-03-05
Payer: MEDICARE

## 2024-03-05 DIAGNOSIS — Z12.31 SCREENING MAMMOGRAM FOR BREAST CANCER: ICD-10-CM

## 2024-03-05 NOTE — PROGRESS NOTES
Physical Therapy                 Therapy Communication Note    Patient Name: Caron Hobson  MRN: [id number]  Today's Date: 3/5/2024     Discipline: Physical Therapy    Missed Visit Reason:  Canceled via MyChart, no reason given.     Missed Time: Cancel    Comment:

## 2024-03-08 ENCOUNTER — APPOINTMENT (OUTPATIENT)
Dept: PHYSICAL THERAPY | Facility: CLINIC | Age: 59
End: 2024-03-08
Payer: MEDICARE

## 2024-03-12 ENCOUNTER — TREATMENT (OUTPATIENT)
Dept: PHYSICAL THERAPY | Facility: CLINIC | Age: 59
End: 2024-03-12
Payer: MEDICARE

## 2024-03-12 DIAGNOSIS — M54.12 CERVICAL RADICULOPATHY: ICD-10-CM

## 2024-03-12 DIAGNOSIS — M25.511 CHRONIC RIGHT SHOULDER PAIN: ICD-10-CM

## 2024-03-12 DIAGNOSIS — G89.29 CHRONIC RIGHT SHOULDER PAIN: ICD-10-CM

## 2024-03-12 DIAGNOSIS — M43.6 NECK STIFFNESS: ICD-10-CM

## 2024-03-12 PROCEDURE — 97110 THERAPEUTIC EXERCISES: CPT | Mod: GP,CQ

## 2024-03-12 ASSESSMENT — PAIN - FUNCTIONAL ASSESSMENT: PAIN_FUNCTIONAL_ASSESSMENT: 0-10

## 2024-03-12 ASSESSMENT — PAIN SCALES - GENERAL: PAINLEVEL_OUTOF10: 6

## 2024-03-12 NOTE — PROGRESS NOTES
"Physical Therapy    Physical Therapy Treatment    Patient Name: Caron Hobson  MRN: [id number]  Today's Date: 3/12/2024  Treatment visit: 3  [employer]  POC end date: 5/5/24  Time Calculation  Start Time: 0930  Stop Time: 1015  Time Calculation (min): 45 min  PT Therapeutic Procedures Time Entry  Therapeutic Exercise Time Entry: 45    Assessment:  PT Assessment  Evaluation/Treatment Tolerance: Patient tolerated treatment well  Patient able to tolerate most given exercises today, did have increased pain with arom shoulder abd, rated around a 9/10, added cane which allowed patient to tolerate a lower pain level and complete exercise. Good responses to new exercises, cane extensions/post raises and rotational ISO's, tolerable pain mostly fatigue felt. Finishes the session on pulleys then heat, pain level in the shoulder reported at a 5/10 and neck a 7/10.     Plan:    Continue treatment per current POC  Current Problem  1. Cervical radiculopathy  Follow Up In Physical Therapy      2. Chronic right shoulder pain  Follow Up In Physical Therapy      3. Neck stiffness  Follow Up In Physical Therapy      General:     Subjective    Arrives to therapy being fair, currently having 6/10 right shoulder pain and 9/10 right sided cervical pain. Intermittent tingling down the right arm when patient is active suing the right arm, last happened earlier this morning. Compliant with home exercises once every other day. No other updates or concerns at this time.     Pain  Pain Assessment  Pain Assessment: 0-10  Pain Score: 6 (Shouler 6/10, 9/10 in neck)    Objective      Treatments:  There Ex:  Shoulder shrugs x 20  Scapular retractions x 20  Shoulder AROM flexion x20   Shoulder AAROM abduction x 20  Shoulder AAROM wand ER x20  Standing cane assisted shoulder extension x 20  Standing cane assisted shoulder post raise x 20  Standing YTB rows 2x10 with 3\" hold, VC for technique  Standing YTB pulldowns 2x10 with 3\" hold, VC for " technique  ISO shoulder iR/iR x 10 5 sec   Pulleys x 5 min to finish session  MHP afterwards x 7 min    OP EDUCATION:     Goals:  Active       PT Problem       PT Goal 1       Start:  02/06/24    Expected End:  03/12/24       Pt. Will improve active R shoulder ROM by 10 degrees in 3 weeks to improve ability to reach and  objects in refrigerator.         PT Goal 2       Start:  02/06/24    Expected End:  03/12/24       Pt. Will improve Quick Dash by 8 points (final score) to indicate reduced self rating of disability and improved QOL.         PT Goal 3       Start:  02/06/24    Expected End:  03/12/24       Pt. Will be able to open jars with reduced pain in 5 weeks to improve quality of life.         PT Goal 4       Start:  02/06/24    Expected End:  05/05/24       Pt. Will improve R shoulder AROM by 20 degrees by end of POC to improve ability to reach to  objects in refrigerator.         PT Goal 5       Start:  02/06/24    Expected End:  05/05/24       Pt. Will improve Quick Dash by 16 points to meet MCID compared ot normative values.         LTG - Misc 1       Start:  02/06/24    Expected End:  05/05/24       Pt. Will be able to carry laundry basket 20 ft. With reduced pain by end of POC to improve QOL and ability to perform this ADL and task that is part of her occupation.         LT - Misc 2       Start:  02/06/24    Expected End:  05/05/24       Pt. Will improve Patient specific questionnaire by 2 points to meet MDC.

## 2024-03-19 ENCOUNTER — DOCUMENTATION (OUTPATIENT)
Dept: PHYSICAL THERAPY | Facility: CLINIC | Age: 59
End: 2024-03-19
Payer: MEDICARE

## 2024-03-20 ENCOUNTER — DOCUMENTATION (OUTPATIENT)
Dept: PHYSICAL THERAPY | Facility: CLINIC | Age: 59
End: 2024-03-20
Payer: MEDICARE

## 2024-03-20 PROBLEM — Z98.1 S/P CERVICAL SPINAL FUSION: Status: ACTIVE | Noted: 2024-03-20

## 2024-03-20 PROBLEM — M25.511 PAIN OF RIGHT SHOULDER REGION: Status: ACTIVE | Noted: 2024-01-30

## 2024-03-20 PROBLEM — R11.0 NAUSEA: Status: ACTIVE | Noted: 2024-03-20

## 2024-03-20 PROBLEM — R52 COMPLAINTS OF TOTAL BODY PAIN: Status: ACTIVE | Noted: 2024-03-20

## 2024-03-20 PROBLEM — K56.699: Status: ACTIVE | Noted: 2017-12-10

## 2024-03-20 PROBLEM — M25.569 JOINT PAIN, KNEE: Status: ACTIVE | Noted: 2024-03-20

## 2024-03-20 PROBLEM — F33.9 DEPRESSION, MAJOR, RECURRENT (CMS-HCC): Status: ACTIVE | Noted: 2024-03-20

## 2024-03-20 PROBLEM — M54.81 BILATERAL OCCIPITAL NEURALGIA: Status: ACTIVE | Noted: 2024-03-20

## 2024-03-20 PROBLEM — M67.919 DISORDER OF ROTATOR CUFF: Status: ACTIVE | Noted: 2024-03-20

## 2024-03-20 PROBLEM — M25.569 KNEE PAIN: Status: ACTIVE | Noted: 2024-03-20

## 2024-03-20 PROBLEM — R53.1 ASTHENIA: Status: ACTIVE | Noted: 2024-03-20

## 2024-03-20 PROBLEM — S32.009A CLOSED FRACTURE OF TRANSVERSE PROCESS OF LUMBAR VERTEBRA (MULTI): Status: ACTIVE | Noted: 2021-01-31

## 2024-03-20 PROBLEM — M25.562 BILATERAL KNEE PAIN: Status: ACTIVE | Noted: 2024-03-20

## 2024-03-20 PROBLEM — J45.20 MILD INTERMITTENT ASTHMA WITHOUT COMPLICATION (HHS-HCC): Status: ACTIVE | Noted: 2021-01-08

## 2024-03-20 PROBLEM — M25.561 BILATERAL KNEE PAIN: Status: ACTIVE | Noted: 2024-03-20

## 2024-03-20 PROBLEM — S12.101A CLOSED NONDISPLACED FRACTURE OF SECOND CERVICAL VERTEBRA (MULTI): Status: ACTIVE | Noted: 2021-01-31
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
## 2024-03-20 PROBLEM — R11.10 VOMITING: Status: ACTIVE | Noted: 2017-12-11

## 2024-03-20 PROBLEM — Z98.1 HISTORY OF CERVICAL SPINAL ARTHRODESIS: Status: ACTIVE | Noted: 2024-03-20

## 2024-03-20 PROBLEM — Z98.84 BARIATRIC SURGERY STATUS: Status: ACTIVE | Noted: 2024-03-20

## 2024-03-20 PROBLEM — F32.A DEPRESSIVE DISORDER: Status: ACTIVE | Noted: 2022-05-02

## 2024-03-20 PROBLEM — Z78.9: Status: ACTIVE | Noted: 2023-02-06

## 2024-03-20 PROBLEM — T14.8XXA VASCULAR INJURY: Status: ACTIVE | Noted: 2021-01-31

## 2024-03-20 PROBLEM — K76.0 NAFLD (NONALCOHOLIC FATTY LIVER DISEASE): Status: ACTIVE | Noted: 2017-11-15

## 2024-03-20 PROBLEM — K80.20 CALCULUS OF GALLBLADDER: Status: ACTIVE | Noted: 2024-03-20

## 2024-03-20 PROBLEM — E66.9 OBESITY WITH BODY MASS INDEX 30 OR GREATER: Status: ACTIVE | Noted: 2023-02-06

## 2024-03-20 PROBLEM — S72.413A: Status: ACTIVE | Noted: 2024-03-20

## 2024-03-20 PROBLEM — E44.0 MALNUTRITION OF MODERATE DEGREE (MULTI): Status: ACTIVE | Noted: 2017-11-27

## 2024-03-20 PROBLEM — E86.0 DEHYDRATION: Status: ACTIVE | Noted: 2024-03-20

## 2024-03-20 PROBLEM — R51.9 HEAD PAIN: Status: ACTIVE | Noted: 2024-03-20

## 2024-03-20 PROBLEM — R63.8 ALTERATION IN NUTRITION: Status: ACTIVE | Noted: 2024-03-20

## 2024-03-20 PROBLEM — Z98.84 GASTRIC BYPASS STATUS FOR OBESITY: Status: ACTIVE | Noted: 2024-03-20

## 2024-03-20 PROBLEM — E51.9 THIAMINE DEFICIENCY: Status: ACTIVE | Noted: 2024-03-20

## 2024-03-20 PROBLEM — R30.0 DYSURIA: Status: ACTIVE | Noted: 2024-03-20

## 2024-03-20 RX ORDER — FERROUS SULFATE 325(65) MG
325 TABLET ORAL
COMMUNITY
Start: 2023-07-06

## 2024-03-20 RX ORDER — DOXYCYCLINE 100 MG/1
100 CAPSULE ORAL
COMMUNITY
Start: 2013-12-24

## 2024-03-20 RX ORDER — CYANOCOBALAMIN 1000 UG/ML
1 INJECTION, SOLUTION INTRAMUSCULAR; SUBCUTANEOUS
COMMUNITY
Start: 2019-02-08

## 2024-03-20 RX ORDER — CYCLOBENZAPRINE HCL 10 MG
10 TABLET ORAL
COMMUNITY
Start: 2023-10-26

## 2024-03-20 RX ORDER — SYRINGE W-NEEDLE,DISPOSAB,3 ML 25GX5/8"
SYRINGE, EMPTY DISPOSABLE MISCELLANEOUS
COMMUNITY
Start: 2019-02-08

## 2024-03-20 RX ORDER — AMITRIPTYLINE HYDROCHLORIDE 50 MG/1
50 TABLET, FILM COATED ORAL
COMMUNITY
Start: 2021-08-13

## 2024-03-20 RX ORDER — SIMVASTATIN 40 MG/1
40 TABLET, FILM COATED ORAL
COMMUNITY
Start: 2024-02-04

## 2024-03-20 RX ORDER — CIPROFLOXACIN 500 MG/1
500 TABLET ORAL DAILY
COMMUNITY
Start: 2023-11-07

## 2024-03-20 RX ORDER — MULTIVITAMIN
1 TABLET ORAL DAILY
COMMUNITY
Start: 2016-06-20

## 2024-03-20 RX ORDER — LISINOPRIL 40 MG/1
40 TABLET ORAL
COMMUNITY
Start: 2016-06-20

## 2024-03-20 RX ORDER — SERTRALINE HYDROCHLORIDE 25 MG/1
25 TABLET, FILM COATED ORAL DAILY
COMMUNITY
Start: 2022-07-21

## 2024-03-20 RX ORDER — SIMVASTATIN 10 MG/1
TABLET, FILM COATED ORAL
COMMUNITY
Start: 2021-05-27

## 2024-03-20 RX ORDER — DICLOFENAC SODIUM 10 MG/G
GEL TOPICAL
COMMUNITY
Start: 2023-04-14

## 2024-03-20 RX ORDER — ACYCLOVIR 400 MG/1
400 TABLET ORAL 3 TIMES DAILY
COMMUNITY
Start: 2023-11-03

## 2024-03-20 RX ORDER — OXYCODONE AND ACETAMINOPHEN 2.5; 325 MG/1; MG/1
TABLET ORAL
COMMUNITY
Start: 2021-02-23

## 2024-03-20 RX ORDER — ATORVASTATIN CALCIUM 40 MG/1
40 TABLET, FILM COATED ORAL
COMMUNITY
Start: 2015-03-12

## 2024-03-20 RX ORDER — SUMATRIPTAN SUCCINATE 100 MG/1
100 TABLET ORAL
COMMUNITY
Start: 2021-02-23

## 2024-03-20 RX ORDER — NAPROXEN 500 MG/1
500 TABLET ORAL
COMMUNITY
Start: 2023-01-24

## 2024-03-20 RX ORDER — MICONAZOLE NITRATE 2 %
CREAM WITH APPLICATOR VAGINAL
COMMUNITY
Start: 2023-11-03

## 2024-03-20 RX ORDER — UBIQUINOL 100 MG
CAPSULE ORAL
COMMUNITY
Start: 2023-11-30

## 2024-03-20 RX ORDER — LANCETS
EACH MISCELLANEOUS 2 TIMES DAILY
COMMUNITY
Start: 2015-06-10

## 2024-03-20 RX ORDER — BLOOD SUGAR DIAGNOSTIC
STRIP MISCELLANEOUS
COMMUNITY
Start: 2016-03-31

## 2024-03-20 RX ORDER — PEN NEEDLE, DIABETIC 32GX 5/32"
NEEDLE, DISPOSABLE MISCELLANEOUS
COMMUNITY
Start: 2024-01-25

## 2024-03-20 RX ORDER — TRAMADOL HYDROCHLORIDE 50 MG/1
50 TABLET ORAL EVERY 6 HOURS PRN
COMMUNITY
Start: 2024-01-19

## 2024-03-20 RX ORDER — INSULIN PUMP SYRINGE, 3 ML
1 EACH MISCELLANEOUS
COMMUNITY
Start: 2014-12-04

## 2024-03-20 RX ORDER — NEOMYCIN SULFATE, POLYMYXIN B SULFATE, HYDROCORTISONE 3.5; 10000; 1 MG/ML; [USP'U]/ML; MG/ML
SOLUTION/ DROPS AURICULAR (OTIC)
COMMUNITY
Start: 2014-06-30

## 2024-03-20 RX ORDER — METRONIDAZOLE 500 MG/1
500 TABLET ORAL 2 TIMES DAILY
COMMUNITY
Start: 2023-07-06

## 2024-03-20 RX ORDER — MELOXICAM 15 MG/1
TABLET ORAL
COMMUNITY
Start: 2023-02-06

## 2024-03-20 NOTE — PROGRESS NOTES
Physical Therapy                 Therapy Communication Note    Patient Name: Caron Hobson  MRN: [id number]  Today's Date: 3/20/2024     Discipline: Physical Therapy    Missed Visit Reason:  Patient no call no showed to today's therapy session.     Missed Time: No Show    Comment:

## 2024-03-20 NOTE — PROGRESS NOTES
Physical Therapy                 Therapy Communication Note    Patient Name: Caron Hobson  MRN: [id number]  Today's Date: 3/14/24     Discipline: Physical Therapy    Missed Visit Reason:  No call No show    Missed Time: No Show    Comment: 3/14/24

## 2024-03-21 ENCOUNTER — DOCUMENTATION (OUTPATIENT)
Dept: PHYSICAL THERAPY | Facility: CLINIC | Age: 59
End: 2024-03-21
Payer: MEDICARE

## 2024-03-21 NOTE — PROGRESS NOTES
Physical Therapy    Discharge Summary    Name: Caron Hobson  MRN: [id number]  : 1965  Date: 24    Discharge Summary: PT    Discharge Information: Date of discharge 3/21/24, Date of last visit 24, Date of evaluation 24, and Number of attended visits 3    Therapy Summary: See last treatment note. Pt. With high levels of pain limiting participation.  Overall, pt. With limited progress.  Unable to formally assess progress due to pt. Not showing up to her last appointment.    Discharge Status: Pt. With poor attendance to sessions, pt. Given multiple reminders on our cancellation/no-show policy.     Rehab Discharge Reason: Attendance inconsistent

## 2024-03-22 ENCOUNTER — HOSPITAL ENCOUNTER (OUTPATIENT)
Dept: RADIOLOGY | Facility: CLINIC | Age: 59
Discharge: HOME | End: 2024-03-22
Payer: MEDICARE

## 2024-03-22 ENCOUNTER — APPOINTMENT (OUTPATIENT)
Dept: RADIOLOGY | Facility: CLINIC | Age: 59
End: 2024-03-22
Payer: MEDICARE

## 2024-03-22 VITALS — WEIGHT: 190.04 LBS | HEIGHT: 61 IN | BODY MASS INDEX: 35.88 KG/M2

## 2024-03-22 DIAGNOSIS — Z12.31 SCREENING MAMMOGRAM FOR BREAST CANCER: ICD-10-CM

## 2024-03-22 PROCEDURE — 77067 SCR MAMMO BI INCL CAD: CPT

## 2024-03-22 PROCEDURE — 77063 BREAST TOMOSYNTHESIS BI: CPT | Performed by: RADIOLOGY

## 2024-03-22 PROCEDURE — 77067 SCR MAMMO BI INCL CAD: CPT | Performed by: RADIOLOGY

## 2024-04-29 NOTE — PROGRESS NOTES
"   Patient: Caron Hobson    [id number]  : 1965 -- AGE 58 y.o.    Provider: Steven Farmer MD PhD     Location  [facility]   Service Date: 2024              [facility]  New Visit Note      Virtual or Telephone Consent  {Complete for a virtual or telephone visit:78686}  {Select who provided consent:31406}      HISTORY OF PRESENT ILLNESS     The patient's referring provider is: No ref. provider found    REASON FOR VISIT: No chief complaint on file.       HISTORY OF PRESENT ILLNESS   Ms. Caron Hobson is a 58 y.o. female with past medical history of ELIZABETH, insomnia, COPD/asthma,cervical radiculopathy, at C4-C5 with stenosis, NAFLD, HTN,  HLD, obesity with gastric bypass 18, T2DM, deepression, who presents to a [facility] for a sleep medicine evaluation with concerns of ***      PAST SLEEP HISTORY  She had a prior sleep study on 2021 at a BMI of 35.  She had a sleep efficiency of 77% with an AHI of 2.8 events/hour, supine AHI 3.2 events/hour, SpO2 natalie of 92%.  PLM's were present at 3.7 events/hour. She also had a prior HSAT on 21 at a BMI of 35 that showed an ERNIE of 2.6/h (notes in report indicate that a PSG in 09 showed an AHI of 17/h (supine AHI of 36/h, REM AHI of 37/h and was on CPAP at 9 cwp).    CURRENT HISTORY  On today's visit, the patient reports ***      Sleep schedule:  In bed: ***  Activities in bed:   {Bedtime Activities:97829}  Subjective sleep latency:  ***  Awakenings during night: ***  Length of awakenings: ***  Final awakening time: ***  Out of bed: ***  Overall estimate of total sleep time: ***    Weekends: ***  Preferred sleeping position: {SLEEP POSITION:03193}  Typically sleeps {Blank single:73689::\"alone\",\"with a partner\",\"with her \",\"with his wife\"}.     Issues with sleep onset or maintenance: ***    Associated sleep symptoms:  Breathing during sleep: {Breathing during " "sleep:18490}  Behaviors at night: {Yes/No:88266::\"No \"}  Sleep paralysis: {Yes/No:00854::\"No \"}  Hypnogogic or hypnopompic hallucinations: {Yes/No:39996::\"No \"}  Cataplexy: {Yes/No:65385::\"No \"}    Leg symptoms and timing:  {RLS:31749}      Sleep environment:  Pets in bed :  {YES (DEF)/NO:47497::\"No\"}  Bedroom temperature: {BEDROOM TEMP:46273::\"WNL\"}  Noise :   {YES (DEF)/NO:73603::\"No\"\"}   Issues with bed comfort :   {YES (DEF)/NO:32474::\"No\"}       Daytime Symptoms:  On awakening patient reports: {Morning Symptoms:32144}    Daytime: During the day, she {DOES/ DOES NOT:70442} doze unintentionally while inactive.  During more active tasks, she {SPPNEVER:74412::\"never\"} is drowsy.  With regards to daytime napping, the patient reports {SPPNEVER:40577::\"never\"} taking naps. If she takes a nap, typically she awakens {SPPREFRESHED:67546}.  She {DOES/ DOES NOT:52815} report that poor sleep results in mood-related irritability. She {DOES/ DOES NOT:39630} report that poor sleep results in issues with memory/concentration.    Driving History: she has a *** commute from home to work. The patient typically {SLEEP TRANSPORT:95346::\"drives\"} to work.  She {is/is not:64492::\"is not\"} a . With driving, the patient {Actions; denies/reports/admits to:16745::\"denies\"} sleepy driving, with *** sleepiness-related motor vehicle accidents and *** near misses.      ESS: ***  CYNTHIA: ***  FOSQ:  ***      REVIEW OF SYSTEMS     REVIEW OF SYSTEMS  Review of Systems  {SLEEP ROS:10539:::1}      ALLERGIES AND MEDICATIONS     ALLERGIES  Allergies   Allergen Reactions    Coconut GI Upset and Hives    Other Unknown       MEDICATIONS  Current Outpatient Medications   Medication Sig Dispense Refill    acyclovir (Zovirax) 400 mg tablet Take 1 tablet (400 mg) by mouth 3 times a day.      Advair Diskus 250-50 mcg/dose diskus inhaler       albuterol 90 mcg/actuation inhaler       Alcohol Prep Pads pads, medicated USE AS DIRECTED      " "amitriptyline (Elavil) 50 mg tablet Take 1 tablet (50 mg) by mouth.      antipyrine/benzocaine/gly/urea (ANTIPYRINE-BENZOCAINE-UREA-GLY OTIC) Administer into affected ear(s).      atorvastatin (Lipitor) 40 mg tablet Take 1 tablet (40 mg) by mouth.      BD Porsha 2nd Gen Pen Needle 32 gauge x 5/32\" needle USE AS DIRECTED ONCE WEEKLY      Blood glucose monitoring meter kit kit 1 each.      cholecalciferol (Vitamin D-3) 50,000 unit capsule Take by mouth.      ciprofloxacin (Cipro) 500 mg tablet Take 1 tablet (500 mg) by mouth once daily.      cyanocobalamin (Vitamin B-12) 1,000 mcg/mL injection Inject 1 mL (1,000 mcg) into the muscle.      cyclobenzaprine (Flexeril) 10 mg tablet 1 tablet (10 mg).      diclofenac sodium (Voltaren) 1 % gel APPLY 2 GRAMS TOPICALLY TO THE AFFECTED AREA FOUR TIMES DAILY      doxycycline (Vibramycin) 100 mg capsule Take 1 capsule (100 mg) by mouth.      exenatide microspheres 2 mg/0.65 mL pen injector Inject 2 mg under the skin.      EXENATIDE SUBQ Inject under the skin.      ferrous sulfate, 325 mg ferrous sulfate, tablet Take 1 tablet by mouth.      FreeStyle Test strip Indications: Diabetes. 250 ,check twice daily.      gabapentin (Neurontin) 300 mg capsule Take 1 capsule (300 mg) by mouth 2 times a day. 60 capsule 2    lancets misc 2 times a day.      levonorgestrel (Mirena) 21 mcg/24 hours (8 yrs) 52 mg IUD 52 mg by intrauterine route.      lidocaine (Lidoderm) 5 % patch Place 1 patch over 12 hours on the skin once daily. Remove & discard patch within 12 hours or as directed by MD. 10 patch 0    lisinopril 40 mg tablet Take 1 tablet (40 mg) by mouth.      meloxicam (Mobic) 15 mg tablet Take by mouth.      metFORMIN 500 mg/5 mL suspension,extended rel recon Take 1,000 mg by mouth.      metformin HCl (METFORMIN ORAL) Take by mouth every 12 hours.      metroNIDAZOLE (Flagyl) 500 mg tablet Take 1 tablet (500 mg) by mouth twice a day.      miconazole (Micotin) 2 % vaginal cream INSERT " "VAGINALLY ONCE A NIGHT FOR 7 DAYS      montelukast sodium (MONTELUKAST ORAL) Take by mouth.      multivitamin (MULTIPLE VITAMINS ORAL) Take by mouth.      multivitamin (MULTIPLE VITAMINS ORAL) Take by mouth.      multivitamin tablet Take 1 tablet by mouth once daily.      naproxen (Naprosyn) 500 mg tablet Take 1 tablet (500 mg) by mouth.      neomycin-polymyxin-HC (Cortisporin) otic solution Administer into affected ear(s).      oxyCODONE-acetaminophen (Percocet) 2.5-325 mg tablet Take by mouth.      Ozempic 0.25 mg or 0.5 mg (2 mg/3 mL) pen injector INJECT 0.25MG UNDER THE SKIN ONCE WEEKLY      sertraline (Zoloft) 25 mg tablet Take 1 tablet (25 mg) by mouth once daily.      simvastatin (Zocor) 10 mg tablet Take by mouth.      simvastatin (Zocor) 40 mg tablet 1 tablet (40 mg).      SUMAtriptan (Imitrex) 100 mg tablet Take 1 tablet (100 mg) by mouth.      syringe with needle 3 mL 22 x 1 1/2\" syringe Syringe 22G X 1\" 3 ML inject B12 medication into your deltoid muscle once a month. Quantity: 1 Refills: 11 Lara Irizarry MD Start : 8-Feb-2019 Active      traMADol (Ultram) 50 mg tablet Take 1 tablet (50 mg) by mouth every 6 hours if needed.      walker misc front wheeled walker    Quantity: 1   Refills: 0   Ordered: 10-Feb-2021  Janet Moran Start: 10-Feb-2021  Generic Substitution Allowed       No current facility-administered medications for this visit.         PAST HISTORY     PAST MEDICAL HISTORY  She  has a past medical history of Calculus of gallbladder without cholecystitis without obstruction and Morbid (severe) obesity due to excess calories (Multi) (02/07/2018).  ***     PAST SURGICAL HISTORY:  Past Surgical History:   Procedure Laterality Date    APPENDECTOMY  03/07/2017    Appendectomy    CHOLECYSTECTOMY  03/07/2017    Cholecystectomy    CT ANGIO NECK  1/31/2021    CT ANGIO NECK 1/31/2021    GASTRIC BYPASS  01/24/2018    Gastric Surgery For Morbid Obesity Bypass With Yasmany-en-Y    MR NECK ANGIO WO IV " "CONTRAST  2/1/2021    MR NECK ANGIO WO IV CONTRAST 2/1/2021 [facility] CLINICAL LEGACY    OTHER SURGICAL HISTORY  02/23/2021    Cervical vertebral fusion    OTHER SURGICAL HISTORY  01/03/2018    Incision And Drainage Of Skin Abscess, Simple    TOTAL KNEE ARTHROPLASTY  03/07/2017    Knee Replacement       FAMILY HISTORY  No family history on file.  She {DOES/DOES NOT EC:40233} have a family history of sleep disorder (e.g., sleep apnea, narcolepsy in any first degree relatives).      SOCIAL HISTORY  She  reports that she has never smoked. She has never used smokeless tobacco. She reports that she does not drink alcohol and does not use drugs. She currently lives {Xenia single:19197::\"alone\",\"with a partner\",\"with her \",\"with his wife\"}.     Work history: The patient currently {Xenia single:19197::\"works full time as a ***\",\"works part time as a ***\",\"is not working.\",\"has been retired for *** years but previously worked as ***\",\" is on disability due to *** but used to work as ***\"}. The patient goes to work from *** {Time; am/pm:25749} to *** {Time; am/pm:71055}.     Caffeine consumption: {yes/no:48032}  Alcohol consumption: {yes/no:81804}  Smoking: {yes/no:71712}  Marijuana: {yes/no:00517}      PHYSICAL EXAM     Physical Examination: There were no vitals taken for this visit.    PREVIOUS WEIGHTS:  Wt Readings from Last 3 Encounters:   03/22/24 86.2 kg (190 lb 0.6 oz)   01/30/24 86.2 kg (190 lb)   01/22/24 86.2 kg (190 lb)       General: The patient is a pleasant female, in no acute distress. HEENT: She has a  a modified Mallampati grade {1-4:20576} airway with {Numbers; 0-4 (with +):58923:::1} tonsils bilaterally. The soft palate was {Sleep Palate Exam:45383::\"normal\"} and the uvula was {Sleep exam -  Uvula:54147::\"normal\"}. The A/P diameter of the velopharynx {WAS/WAS NOT:23177::\"was not\"} narrowed. The oropharynx {WAS/WAS NOT:58744::\"was not\"} shallow in the A/P diameter. Lateral wall narrowing {WAS/WAS " "NOT:83424::\"was not\"} present. Tongue ridging {WAS/WAS NOT:72316::\"was not\"} present. Erythema of the posterior pharynx {WAS/WAS NOT:84247::\"was not\"} present. Mucosal hypertrophy in the posterior oropharynx {WAS/WAS NOT:74358::\"was not\"} present. Retrognathia {WAS/WAS NOT:25523::\"was not\"} and micrognathia {WAS/WAS NOT:52827::\"was not\"} present. Neck: The neck was not enlarged. No JVP, bruits or lymphadenopathy was appreciated. Chest: Clear to auscultation. No wheezes, rales, or rhonchi. Cardiovascular: Regular rate and rhythm. No murmurs, gallops, or clicks. Abdomen: Soft, nontender, nondistended. Positive bowel sounds. Extremities: No clubbing, cyanosis, or edema is noted. Neurologic exam: Alert, oriented x3 and was grossly non-focal.    RESULTS/DATA     Iron (ug/dL)   Date Value   01/30/2019 60   04/18/2018 41     Iron Saturation (%)   Date Value   01/30/2019 18 (L)   04/18/2018 14 (L)     TIBC (ug/dL)   Date Value   01/30/2019 332   04/18/2018 296     Ferritin (ug/L)   Date Value   01/30/2019 222 (H)   04/18/2018 232 (H)       Bicarbonate (mmol/L)   Date Value   10/02/2021 23   02/06/2021 24   02/05/2021 25               DIAGNOSES     Problem List and Orders  There are no diagnoses linked to this encounter.      ASSESSMENT/PLAN     Ms. Hobson is a 58 y.o. female and  has a past medical history of Calculus of gallbladder without cholecystitis without obstruction and Morbid (severe) obesity due to excess calories (Multi) (02/07/2018). She presents to  the [facility] to address ***.      Follow up: ***         Steven Farmer MD PhD   "

## 2024-05-01 ENCOUNTER — APPOINTMENT (OUTPATIENT)
Dept: SLEEP MEDICINE | Facility: CLINIC | Age: 59
End: 2024-05-01
Payer: MEDICARE

## 2024-05-15 ENCOUNTER — OFFICE VISIT (OUTPATIENT)
Dept: ORTHOPEDIC SURGERY | Facility: CLINIC | Age: 59
End: 2024-05-15
Payer: MEDICARE

## 2024-05-15 VITALS — WEIGHT: 190 LBS | BODY MASS INDEX: 35.87 KG/M2 | HEIGHT: 61 IN

## 2024-05-15 DIAGNOSIS — M54.12 CERVICAL RADICULITIS: Primary | ICD-10-CM

## 2024-05-15 PROCEDURE — 99214 OFFICE O/P EST MOD 30 MIN: CPT | Performed by: ORTHOPAEDIC SURGERY

## 2024-05-15 PROCEDURE — 4010F ACE/ARB THERAPY RXD/TAKEN: CPT | Performed by: ORTHOPAEDIC SURGERY

## 2024-05-15 ASSESSMENT — PAIN - FUNCTIONAL ASSESSMENT: PAIN_FUNCTIONAL_ASSESSMENT: 0-10

## 2024-05-15 NOTE — PROGRESS NOTES
Patient returns for follow-up.  She has been seen by Stefano Eugene.  She complains of progressively worsening right arm radicular symptoms, more or less the whole arm, but when pressed to trace things out it appears to be mostly in the C6 nerve root distribution.  She has remote history of C1-4 fusion for a type III dens fracture in 2001.  The pain began shortly after the surgery but has been really intensifying over the last year or so.  She has done physical therapy and had an epidural injection without relief.  She also had a shoulder injection which did not help her symptoms at all.    She denies bowel or bladder incontinence or other symptoms of myelopathy.    She is otherwise in fairly good health.  She does not smoke.    On exam she is well-appearing and in no distress.  She walks with a normal gait.  No focal neurologic deficits.  She does have hyperesthesias and dysesthesias in the right arm with light touch.  Positive Adonis sign.    I personally reviewed MRI of her cervical spine.  This shows previous fusion from C1-4.  There is no spinal cord compression.  There are degenerative changes at C5-6 with mild to moderate foraminal narrowing bilaterally.    I explained to her that her cervical MRI is not really all that impressive, there is not appear to be severe focal narrowing along the right side, and therefore I really cannot rule in or out cervical degenerative changes with nerve compression as her source of pain.  I did recommend we check an EMG of the upper extremities, if this does localize to the cervical spine I would be more inclined to offer her surgery.  If the EMG is normal I think she would be better served by pursuing spinal cord stimulators.    She will follow-up with me after the EMG is complete.    *This note was dictated using speech recognition software and was not corrected for spelling or grammatical errors*

## 2024-05-21 ENCOUNTER — APPOINTMENT (OUTPATIENT)
Dept: NEUROLOGY | Facility: HOSPITAL | Age: 59
End: 2024-05-21
Payer: MEDICARE

## 2024-05-29 ENCOUNTER — APPOINTMENT (OUTPATIENT)
Dept: ORTHOPEDIC SURGERY | Facility: CLINIC | Age: 59
End: 2024-05-29
Payer: MEDICARE

## 2024-05-31 ENCOUNTER — HOSPITAL ENCOUNTER (OUTPATIENT)
Dept: NEUROLOGY | Facility: HOSPITAL | Age: 59
Discharge: HOME | End: 2024-05-31
Payer: MEDICARE

## 2024-05-31 DIAGNOSIS — M54.12 CERVICAL RADICULITIS: ICD-10-CM

## 2024-05-31 PROCEDURE — 95909 NRV CNDJ TST 5-6 STUDIES: CPT | Mod: GC | Performed by: STUDENT IN AN ORGANIZED HEALTH CARE EDUCATION/TRAINING PROGRAM

## 2024-05-31 PROCEDURE — 95909 NRV CNDJ TST 5-6 STUDIES: CPT | Performed by: STUDENT IN AN ORGANIZED HEALTH CARE EDUCATION/TRAINING PROGRAM

## 2024-05-31 PROCEDURE — 95886 MUSC TEST DONE W/N TEST COMP: CPT | Mod: GC | Performed by: STUDENT IN AN ORGANIZED HEALTH CARE EDUCATION/TRAINING PROGRAM

## 2024-05-31 PROCEDURE — 95886 MUSC TEST DONE W/N TEST COMP: CPT | Performed by: STUDENT IN AN ORGANIZED HEALTH CARE EDUCATION/TRAINING PROGRAM

## 2024-06-12 ENCOUNTER — PREP FOR PROCEDURE (OUTPATIENT)
Dept: ORTHOPEDIC SURGERY | Facility: CLINIC | Age: 59
End: 2024-06-12

## 2024-06-12 ENCOUNTER — OFFICE VISIT (OUTPATIENT)
Dept: ORTHOPEDIC SURGERY | Facility: CLINIC | Age: 59
End: 2024-06-12
Payer: MEDICARE

## 2024-06-12 VITALS — WEIGHT: 190 LBS | BODY MASS INDEX: 35.87 KG/M2 | HEIGHT: 61 IN

## 2024-06-12 DIAGNOSIS — E11.9 TYPE 2 DIABETES MELLITUS WITHOUT COMPLICATION, WITHOUT LONG-TERM CURRENT USE OF INSULIN (MULTI): ICD-10-CM

## 2024-06-12 DIAGNOSIS — M54.12 CERVICAL RADICULITIS: Primary | ICD-10-CM

## 2024-06-12 DIAGNOSIS — R79.1 ABNORMAL COAGULATION PROFILE: ICD-10-CM

## 2024-06-12 PROCEDURE — 99214 OFFICE O/P EST MOD 30 MIN: CPT | Performed by: ORTHOPAEDIC SURGERY

## 2024-06-12 PROCEDURE — 1036F TOBACCO NON-USER: CPT | Performed by: ORTHOPAEDIC SURGERY

## 2024-06-12 PROCEDURE — 4010F ACE/ARB THERAPY RXD/TAKEN: CPT | Performed by: ORTHOPAEDIC SURGERY

## 2024-06-12 RX ORDER — GABAPENTIN 600 MG/1
600 TABLET ORAL ONCE
OUTPATIENT
Start: 2024-06-12 | End: 2024-06-12

## 2024-06-12 RX ORDER — ACETAMINOPHEN 325 MG/1
975 TABLET ORAL ONCE
OUTPATIENT
Start: 2024-06-12 | End: 2024-06-12

## 2024-06-12 RX ORDER — CEFAZOLIN SODIUM 2 G/100ML
2 INJECTION, SOLUTION INTRAVENOUS ONCE
OUTPATIENT
Start: 2024-06-12 | End: 2024-06-12

## 2024-06-12 RX ORDER — SODIUM CHLORIDE, SODIUM LACTATE, POTASSIUM CHLORIDE, CALCIUM CHLORIDE 600; 310; 30; 20 MG/100ML; MG/100ML; MG/100ML; MG/100ML
100 INJECTION, SOLUTION INTRAVENOUS CONTINUOUS
OUTPATIENT
Start: 2024-06-12

## 2024-06-12 ASSESSMENT — PAIN SCALES - GENERAL: PAINLEVEL_OUTOF10: 5 - MODERATE PAIN

## 2024-06-12 ASSESSMENT — PAIN DESCRIPTION - DESCRIPTORS: DESCRIPTORS: SORE

## 2024-06-12 ASSESSMENT — PAIN - FUNCTIONAL ASSESSMENT: PAIN_FUNCTIONAL_ASSESSMENT: 0-10

## 2024-06-12 NOTE — PROGRESS NOTES
Patient returns to review her EMG results.  She continues to have miserable right arm radicular pain, C6 nerve root distribution.  It is hypersensitive.  It feels like things are getting worse.    She has done physical therapy and had an epidural injection without relief.    Her EMG is unremarkable.    I explained to her that just because the EMG is normal does not mean she does not have cervical radiculopathy, it just means that she does not have any other compressive neuropathies in the arm which is what I wanted to rule out in the first place.  Her MRI does show moderate bilateral foraminal stenosis, worse on the right, at C5-6 and I believe this is the main area of her problem.  Given the fact that she has a fusion from the occiput to C4, I would not like to do a fusion at the C4-5 and C5-6 levels, her disc space is relatively tall and I think she is an excellent candidate for disc arthroplasty at C5-6 which would eliminate the need for more extensive fusion surgery.    I discussed the risks of surgery including bleeding, infection, paralysis, dysphagia, hoarseness, biceps palsy, muscle weakness, CSF leak, bowel or bladder dysfunction, incomplete resolution of pain or numbness, possible worsening of preoperative symptoms, DVT/PE, heart attack, stroke, and other unforeseen medical and anesthesia complications.  She verbalized understanding of the risks, benefits, and alternatives to surgical treatment.  The plan will be for C5-6 anterior cervical decompression and disc arthroplasty.  Surgery was scheduled for July 9 the [facility].    Surgical codes:  65631 C5-6, [employer] simplify    *This note was dictated using speech recognition software and was not corrected for spelling or grammatical errors*

## 2024-06-13 ENCOUNTER — PREP FOR PROCEDURE (OUTPATIENT)
Dept: ORTHOPEDIC SURGERY | Facility: CLINIC | Age: 59
End: 2024-06-13
Payer: MEDICARE

## 2024-06-14 ENCOUNTER — CLINICAL SUPPORT (OUTPATIENT)
Dept: PREADMISSION TESTING | Facility: HOSPITAL | Age: 59
End: 2024-06-14
Payer: MEDICARE

## 2024-06-14 NOTE — CPM/PAT NURSE NOTE
CPM/PAT Nurse Note      Name: Caron Hobson (Caron Hobson)  /Age: 1965/58 y.o.       Past Medical History:   Diagnosis Date    Arthritis     Cervical radiculopathy     Hyperlipidemia     Hypertension     Morbid (severe) obesity due to excess calories (Multi)     Nephrolithiasis     Sleep apnea     Spinal stenosis     Type 2 diabetes mellitus (Multi)        Past Surgical History:   Procedure Laterality Date    APPENDECTOMY      CERVICAL FUSION  2021    C1-2 fusion    CHOLECYSTECTOMY      CT ANGIO NECK  2021    CT ANGIO NECK 2021    ESOPHAGOGASTRODUODENOSCOPY      GASTRIC BYPASS N/A     Gastric Surgery For Morbid Obesity Bypass With Yasmany-en-Y 2018    MR NECK ANGIO WO IV CONTRAST  2021    MR NECK ANGIO WO IV CONTRAST 2021 [facility] CLINICAL LEGACY    OTHER SURGICAL HISTORY N/A 2021    Cervical vertebral fusion 2021    OTHER SURGICAL HISTORY  2018    Incision And Drainage Of Skin Abscess, Simple    TOTAL KNEE ARTHROPLASTY         Patient  reports being sexually active.    No family history on file.    Allergies   Allergen Reactions    Coconut GI Upset and Hives    Other Unknown       Prior to Admission medications    Medication Sig Start Date End Date Taking? Authorizing Provider   acyclovir (Zovirax) 400 mg tablet Take 1 tablet (400 mg) by mouth 3 times a day. 11/3/23   Historical Provider, MD   Advair Diskus 250-50 mcg/dose diskus inhaler  22   Historical Provider, MD   albuterol 90 mcg/actuation inhaler     Historical Provider, MD   Alcohol Prep Pads pads, medicated USE AS DIRECTED 23   Historical Provider, MD   amitriptyline (Elavil) 50 mg tablet Take 1 tablet (50 mg) by mouth. 21   Historical Provider, MD   antipyrine/benzocaine/gly/urea (ANTIPYRINE-BENZOCAINE-UREA-GLY OTIC) Administer into affected ear(s). 14   Historical Provider, MD   atorvastatin (Lipitor) 40 mg tablet Take 1 tablet (40 mg) by mouth. 3/12/15   Historical Provider, MD SAMUEL  "Porsha 2nd Gen Pen Needle 32 gauge x 5/32\" needle USE AS DIRECTED ONCE WEEKLY 1/25/24   Historical Provider, MD   Blood glucose monitoring meter kit kit 1 each. 12/4/14   Historical Provider, MD   cholecalciferol (Vitamin D-3) 50,000 unit capsule Take by mouth. 8/1/17   Historical Provider, MD   ciprofloxacin (Cipro) 500 mg tablet Take 1 tablet (500 mg) by mouth once daily. 11/7/23   Historical Provider, MD   cyanocobalamin (Vitamin B-12) 1,000 mcg/mL injection Inject 1 mL (1,000 mcg) into the muscle. 2/8/19   Historical Provider, MD   cyclobenzaprine (Flexeril) 10 mg tablet 1 tablet (10 mg). 10/26/23   Historical Provider, MD   diclofenac sodium (Voltaren) 1 % gel APPLY 2 GRAMS TOPICALLY TO THE AFFECTED AREA FOUR TIMES DAILY 4/14/23   Historical Provider, MD   doxycycline (Vibramycin) 100 mg capsule Take 1 capsule (100 mg) by mouth. 12/24/13   Historical Provider, MD   exenatide microspheres 2 mg/0.65 mL pen injector Inject 2 mg under the skin. 4/8/16   Historical Provider, MD   EXENATIDE SUBQ Inject under the skin.    Historical Provider, MD   ferrous sulfate, 325 mg ferrous sulfate, tablet Take 1 tablet by mouth. 7/6/23   Historical Provider, MD   FreeStyle Test strip Indications: Diabetes. 250 ,check twice daily. 3/31/16   Historical Provider, MD   gabapentin (Neurontin) 300 mg capsule Take 1 capsule (300 mg) by mouth 2 times a day. 1/22/24 4/21/24  Stefano Eugene PA-C   lancets misc 2 times a day. 6/10/15   Historical Provider, MD   levonorgestrel (Mirena) 21 mcg/24 hours (8 yrs) 52 mg IUD 52 mg by intrauterine route.    Historical Provider, MD   lidocaine (Lidoderm) 5 % patch Place 1 patch over 12 hours on the skin once daily. Remove & discard patch within 12 hours or as directed by MD. 1/30/24   Mounika Serna,    lisinopril 40 mg tablet Take 1 tablet (40 mg) by mouth. 6/20/16   Historical Provider, MD   meloxicam (Mobic) 15 mg tablet Take by mouth. 2/6/23   Historical Provider, MD   metFORMIN 500 mg/5 mL " "suspension,extended rel recon Take 1,000 mg by mouth. 6/20/16   Historical Provider, MD   metformin HCl (METFORMIN ORAL) Take by mouth every 12 hours.    Historical Provider, MD   metroNIDAZOLE (Flagyl) 500 mg tablet Take 1 tablet (500 mg) by mouth twice a day. 7/6/23   Historical Provider, MD   miconazole (Micotin) 2 % vaginal cream INSERT VAGINALLY ONCE A NIGHT FOR 7 DAYS 11/3/23   Historical Provider, MD   montelukast sodium (MONTELUKAST ORAL) Take by mouth.    Historical Provider, MD   multivitamin (MULTIPLE VITAMINS ORAL) Take by mouth. 6/20/16   Historical Provider, MD   multivitamin (MULTIPLE VITAMINS ORAL) Take by mouth. 6/20/16   Historical Provider, MD   multivitamin tablet Take 1 tablet by mouth once daily. 6/20/16   Historical Provider, MD   naproxen (Naprosyn) 500 mg tablet Take 1 tablet (500 mg) by mouth. 1/24/23   Historical Provider, MD   neomycin-polymyxin-HC (Cortisporin) otic solution Administer into affected ear(s). 6/30/14   Historical Provider, MD   oxyCODONE-acetaminophen (Percocet) 2.5-325 mg tablet Take by mouth. 2/23/21   Historical Provider, MD   Ozempic 0.25 mg or 0.5 mg (2 mg/3 mL) pen injector INJECT 0.25MG UNDER THE SKIN ONCE WEEKLY 11/29/23   Historical Provider, MD   sertraline (Zoloft) 25 mg tablet Take 1 tablet (25 mg) by mouth once daily. 7/21/22   Historical Provider, MD   simvastatin (Zocor) 10 mg tablet Take by mouth. 5/27/21   Historical Provider, MD   simvastatin (Zocor) 40 mg tablet 1 tablet (40 mg). 2/4/24   Historical Provider, MD   SUMAtriptan (Imitrex) 100 mg tablet Take 1 tablet (100 mg) by mouth. 2/23/21   Historical Provider, MD   syringe with needle 3 mL 22 x 1 1/2\" syringe Syringe 22G X 1\" 3 ML inject B12 medication into your deltoid muscle once a month. Quantity: 1 Refills: 11 Lara Irizarry MD Start : 8-Feb-2019 Active 2/8/19   Historical Provider, MD   traMADol (Ultram) 50 mg tablet Take 1 tablet (50 mg) by mouth every 6 hours if needed. 1/19/24   Historical " Provider, MD   walker misc front wheeled walker    Quantity: 1   Refills: 0   Ordered: 10-Feb-2021  Janet Moran Start: 10-Feb-2021  Generic Substitution Allowed 2/10/21   Historical Provider, MD CHE MOSQUERA Risk Score    No data to display       Caprini DVT Assessment    No data to display       Modified Frailty Index    No data to display       CHADS2 Stroke Risk  Current as of today        N/A 3 to 100%: High Risk   2 to < 3%: Medium Risk   0 to < 2%: Low Risk     Last Change: N/A          This score determines the patient's risk of having a stroke if the patient has atrial fibrillation.        This score is not applicable to this patient. Components are not calculated.          Revised Cardiac Risk Index    No data to display       Apfel Simplified Score    No data to display       Risk Analysis Index Results This Encounter    No data found in the last 1 encounters.     Scheduled for C- anterior cervical decompression and disc replacement, possible fusion on 24 with Dr. Coe.    Orthopaedic Surgery:   Escobar Coe MD LOV 24 reviewed EMG results.  Pro Fischer MD LOV 24 seen for evaluation of her right shoulder.   -MRI Spine: 01/15/24  IMPRESSION:  1. Degenerative changes as noted above worsening at the C5-6 disc  level since the  MR.      2. The cerebellar tonsils are borderline low lying extending 4 mm  below the foramen magnum.      3. The previously noted C2 fracture has healed.      -XR shoulder right 2+views: 24  IMPRESSION:  No acute bony abnormalities.  No change is seen when compared to the  prior radiographs..    Pain Management: Kat Naranjo MD LOV 24    Neurosurgery: Indra Rebolledo MD LOV 02/15/2022 seen status post 1 year out from her surgery of C1-2 Fusion.    Pulmonary: Sugar Mercado MD [facility] LOV 2022    OBGYN: GLORIA Pyle-VANIA [place] P: [phone], F: [phone]  -[id number]  Sinus tachycardia  Voltage  criteria for left ventricular hypertrophy  Nonspecific T wave abnormality  Abnormal ECG  When compared with ECG of 17-MAR-2020 15:12,  Previous ECG has undetermined rhythm, needs review  Confirmed by SONIA BARCENAS MD ([id number]) on 2/12/2021 7:54:53 AM    Nurse Plan of Action:      ONLY    Savana Rojas RN  Pre-Admission Testing

## 2024-06-18 ENCOUNTER — HOSPITAL ENCOUNTER (OUTPATIENT)
Dept: CARDIOLOGY | Facility: HOSPITAL | Age: 59
Discharge: HOME | End: 2024-06-18
Payer: MEDICARE

## 2024-06-18 DIAGNOSIS — M54.12 CERVICAL RADICULITIS: ICD-10-CM

## 2024-06-19 ENCOUNTER — PRE-ADMISSION TESTING (OUTPATIENT)
Dept: PREADMISSION TESTING | Facility: HOSPITAL | Age: 59
End: 2024-06-19
Payer: MEDICARE

## 2024-06-19 VITALS
WEIGHT: 188.1 LBS | HEART RATE: 70 BPM | OXYGEN SATURATION: 98 % | SYSTOLIC BLOOD PRESSURE: 119 MMHG | TEMPERATURE: 96.5 F | HEIGHT: 61 IN | DIASTOLIC BLOOD PRESSURE: 82 MMHG | BODY MASS INDEX: 35.51 KG/M2

## 2024-06-19 DIAGNOSIS — Z01.818 PREOP EXAMINATION: Primary | ICD-10-CM

## 2024-06-19 DIAGNOSIS — M54.12 CERVICAL RADICULITIS: ICD-10-CM

## 2024-06-19 DIAGNOSIS — E11.9 TYPE 2 DIABETES MELLITUS WITHOUT COMPLICATION, WITHOUT LONG-TERM CURRENT USE OF INSULIN (MULTI): ICD-10-CM

## 2024-06-19 DIAGNOSIS — R79.1 ABNORMAL COAGULATION PROFILE: ICD-10-CM

## 2024-06-19 LAB
ABO GROUP (TYPE) IN BLOOD: NORMAL
ANION GAP SERPL CALC-SCNC: 13 MMOL/L (ref 10–20)
ANTIBODY SCREEN: NORMAL
APTT PPP: 34 SECONDS (ref 27–38)
BASOPHILS # BLD AUTO: 0.01 X10*3/UL (ref 0–0.1)
BASOPHILS NFR BLD AUTO: 0.4 %
BUN SERPL-MCNC: 11 MG/DL (ref 6–23)
CALCIUM SERPL-MCNC: 9.5 MG/DL (ref 8.6–10.6)
CHLORIDE SERPL-SCNC: 106 MMOL/L (ref 98–107)
CO2 SERPL-SCNC: 27 MMOL/L (ref 21–32)
CREAT SERPL-MCNC: 0.63 MG/DL (ref 0.5–1.05)
EGFRCR SERPLBLD CKD-EPI 2021: >90 ML/MIN/1.73M*2
EOSINOPHIL # BLD AUTO: 0.06 X10*3/UL (ref 0–0.7)
EOSINOPHIL NFR BLD AUTO: 2.2 %
ERYTHROCYTE [DISTWIDTH] IN BLOOD BY AUTOMATED COUNT: 12.6 % (ref 11.5–14.5)
EST. AVERAGE GLUCOSE BLD GHB EST-MCNC: 114 MG/DL
GLUCOSE SERPL-MCNC: 82 MG/DL (ref 74–99)
HBA1C MFR BLD: 5.6 %
HCT VFR BLD AUTO: 38.6 % (ref 36–46)
HGB BLD-MCNC: 12.4 G/DL (ref 12–16)
IMM GRANULOCYTES # BLD AUTO: 0 X10*3/UL (ref 0–0.7)
IMM GRANULOCYTES NFR BLD AUTO: 0 % (ref 0–0.9)
INR PPP: 1 (ref 0.9–1.1)
LYMPHOCYTES # BLD AUTO: 1.79 X10*3/UL (ref 1.2–4.8)
LYMPHOCYTES NFR BLD AUTO: 64.9 %
MCH RBC QN AUTO: 27.6 PG (ref 26–34)
MCHC RBC AUTO-ENTMCNC: 32.1 G/DL (ref 32–36)
MCV RBC AUTO: 86 FL (ref 80–100)
MONOCYTES # BLD AUTO: 0.23 X10*3/UL (ref 0.1–1)
MONOCYTES NFR BLD AUTO: 8.3 %
NEUTROPHILS # BLD AUTO: 0.67 X10*3/UL (ref 1.2–7.7)
NEUTROPHILS NFR BLD AUTO: 24.2 %
NRBC BLD-RTO: 0 /100 WBCS (ref 0–0)
PLATELET # BLD AUTO: 217 X10*3/UL (ref 150–450)
POTASSIUM SERPL-SCNC: 3.9 MMOL/L (ref 3.5–5.3)
PROTHROMBIN TIME: 11 SECONDS (ref 9.8–12.8)
RBC # BLD AUTO: 4.49 X10*6/UL (ref 4–5.2)
RBC MORPH BLD: NORMAL
RH FACTOR (ANTIGEN D): NORMAL
SODIUM SERPL-SCNC: 142 MMOL/L (ref 136–145)
WBC # BLD AUTO: 2.8 X10*3/UL (ref 4.4–11.3)

## 2024-06-19 PROCEDURE — 85025 COMPLETE CBC W/AUTO DIFF WBC: CPT

## 2024-06-19 PROCEDURE — 82374 ASSAY BLOOD CARBON DIOXIDE: CPT

## 2024-06-19 PROCEDURE — 87081 CULTURE SCREEN ONLY: CPT

## 2024-06-19 PROCEDURE — 83036 HEMOGLOBIN GLYCOSYLATED A1C: CPT

## 2024-06-19 PROCEDURE — 99214 OFFICE O/P EST MOD 30 MIN: CPT | Performed by: NURSE PRACTITIONER

## 2024-06-19 PROCEDURE — 85610 PROTHROMBIN TIME: CPT

## 2024-06-19 PROCEDURE — 86900 BLOOD TYPING SEROLOGIC ABO: CPT

## 2024-06-19 PROCEDURE — 36415 COLL VENOUS BLD VENIPUNCTURE: CPT

## 2024-06-19 RX ORDER — CHLORHEXIDINE GLUCONATE ORAL RINSE 1.2 MG/ML
15 SOLUTION DENTAL SEE ADMIN INSTRUCTIONS
Qty: 473 ML | Refills: 0 | Status: SHIPPED | OUTPATIENT
Start: 2024-06-19

## 2024-06-19 RX ORDER — CHLORHEXIDINE GLUCONATE 40 MG/ML
SOLUTION TOPICAL 2 TIMES DAILY
Qty: 473 ML | Refills: 0 | Status: SHIPPED | OUTPATIENT
Start: 2024-06-19 | End: 2024-06-24

## 2024-06-19 ASSESSMENT — DUKE ACTIVITY SCORE INDEX (DASI)
CAN YOU DO LIGHT WORK AROUND THE HOUSE LIKE DUSTING OR WASHING DISHES: YES
CAN YOU RUN A SHORT DISTANCE: YES
TOTAL_SCORE: 32.2
CAN YOU DO HEAVY WORK AROUND THE HOUSE LIKE SCRUBBING FLOORS OR LIFTING AND MOVING HEAVY FURNITURE: NO
CAN YOU WALK INDOORS, SUCH AS AROUND YOUR HOUSE: YES
CAN YOU WALK A BLOCK OR TWO ON LEVEL GROUND: YES
CAN YOU PARTICIPATE IN MODERATE RECREATIONAL ACTIVITIES LIKE GOLF, BOWLING, DANCING, DOUBLES TENNIS OR THROWING A BASEBALL OR FOOTBALL: NO
CAN YOU TAKE CARE OF YOURSELF (EAT, DRESS, BATHE, OR USE TOILET): YES
DASI METS SCORE: 6.7
CAN YOU PARTICIPATE IN STRENOUS SPORTS LIKE SWIMMING, SINGLES TENNIS, FOOTBALL, BASKETBALL, OR SKIING: NO
CAN YOU HAVE SEXUAL RELATIONS: YES
CAN YOU DO YARD WORK LIKE RAKING LEAVES, WEEDING OR PUSHING A MOWER: NO
CAN YOU DO MODERATE WORK AROUND THE HOUSE LIKE VACUUMING, SWEEPING FLOORS OR CARRYING GROCERIES: YES
CAN YOU CLIMB A FLIGHT OF STAIRS OR WALK UP A HILL: YES

## 2024-06-19 ASSESSMENT — ENCOUNTER SYMPTOMS
ARTHRALGIAS: 1
EYES NEGATIVE: 1
CHILLS: 0
NECK NEGATIVE: 1
CONSTITUTIONAL NEGATIVE: 1
GASTROINTESTINAL NEGATIVE: 1
RESPIRATORY NEGATIVE: 1
NUMBNESS: 1
WEAKNESS: 1
CARDIOVASCULAR NEGATIVE: 1
FEVER: 0
ENDOCRINE NEGATIVE: 1

## 2024-06-19 ASSESSMENT — LIFESTYLE VARIABLES: SMOKING_STATUS: NONSMOKER

## 2024-06-19 ASSESSMENT — PAIN - FUNCTIONAL ASSESSMENT: PAIN_FUNCTIONAL_ASSESSMENT: 0-10

## 2024-06-19 ASSESSMENT — PAIN SCALES - GENERAL: PAINLEVEL_OUTOF10: 0 - NO PAIN

## 2024-06-19 NOTE — CPM/PAT H&P
CPM/PAT Evaluation       Name: Caron Hobson (Caron Hobson)  /Age: 1965/58 y.o.     Visit Type:   In-Person       Chief Complaint: C5-6 anterior cervical decompression and disc replacement, possible fusion     HPI Patient is a 58 year old female scheduled for surgery with Dr. Escobar Coe on 24 related to Cervical radiculitis     Patient referred by Dr. Coe for preoperative evaluation of hypertension, hyperlipidemia, nephrolithiasis, asthma, sleep apnea, diabetes, spinal stenosis     Past Medical History:   Diagnosis Date    Arthritis     Cervical radiculopathy     Hyperlipidemia     Hypertension     Morbid (severe) obesity due to excess calories (Multi)     Nephrolithiasis     Sleep apnea     Spinal stenosis     Type 2 diabetes mellitus (Multi)      Past Surgical History:   Procedure Laterality Date    APPENDECTOMY      CERVICAL FUSION  2021    C1-2 fusion    CHOLECYSTECTOMY      CT ANGIO NECK  2021    CT ANGIO NECK 2021    ESOPHAGOGASTRODUODENOSCOPY      GASTRIC BYPASS N/A     Gastric Surgery For Morbid Obesity Bypass With Yasmany-en-Y 2018    MR NECK ANGIO WO IV CONTRAST  2021    MR NECK ANGIO WO IV CONTRAST 2021 [facility]ICAL LEGAC[facility]    OTHER SURGICAL HISTORY N/A 2021    Cervical vertebral fusion 2021    OTHER SURGICAL HISTORY  2018    Incision And Drainage Of Skin Abscess, Simple    TOTAL KNEE ARTHROPLASTY       Family History   Problem Relation Name Age of Onset    Heart disease Father      Blood clot Daughter       Allergies   Allergen Reactions    Coconut GI Upset and Hives    Lactose Diarrhea    Other Unknown     Prior to Admission medications    Medication Sig Start Date End Date Taking? Authorizing Provider   acyclovir (Zovirax) 400 mg tablet Take 1 tablet (400 mg) by mouth 3 times a day. 11/3/23   Historical Provider, MD   Adv Diskus 250-50 mcg/dose diskus inhaler  22   Historical Provider, MD   albuterol 90 mcg/actuation inhaler      "Historical Provider, MD   Alcohol Prep Pads pads, medicated USE AS DIRECTED 11/30/23   Historical Provider, MD   amitriptyline (Elavil) 50 mg tablet Take 1 tablet (50 mg) by mouth. 8/13/21   Historical Provider, MD   antipyrine/benzocaine/gly/urea (ANTIPYRINE-BENZOCAINE-UREA-GLY OTIC) Administer into affected ear(s). 6/30/14   Historical Provider, MD   atorvastatin (Lipitor) 40 mg tablet Take 1 tablet (40 mg) by mouth. 3/12/15   Historical Provider, MD   BD Porsha 2nd Gen Pen Needle 32 gauge x 5/32\" needle USE AS DIRECTED ONCE WEEKLY 1/25/24   Historical Provider, MD   Blood glucose monitoring meter kit kit 1 each. 12/4/14   Historical Provider, MD   cholecalciferol (Vitamin D-3) 50,000 unit capsule Take by mouth. 8/1/17   Historical Provider, MD   ciprofloxacin (Cipro) 500 mg tablet Take 1 tablet (500 mg) by mouth once daily. 11/7/23   Historical Provider, MD   cyanocobalamin (Vitamin B-12) 1,000 mcg/mL injection Inject 1 mL (1,000 mcg) into the muscle. 2/8/19   Historical Provider, MD   cyclobenzaprine (Flexeril) 10 mg tablet 1 tablet (10 mg). 10/26/23   Historical Provider, MD   diclofenac sodium (Voltaren) 1 % gel APPLY 2 GRAMS TOPICALLY TO THE AFFECTED AREA FOUR TIMES DAILY 4/14/23   Historical Provider, MD   doxycycline (Vibramycin) 100 mg capsule Take 1 capsule (100 mg) by mouth. 12/24/13   Historical Provider, MD   exenatide microspheres 2 mg/0.65 mL pen injector Inject 2 mg under the skin. 4/8/16   Historical Provider, MD   EXENATIDE SUBQ Inject under the skin.    Historical Provider, MD   ferrous sulfate, 325 mg ferrous sulfate, tablet Take 1 tablet by mouth. 7/6/23   Historical Provider, MD   FreeStyle Test strip Indications: Diabetes. 250 ,check twice daily. 3/31/16   Historical Provider, MD   gabapentin (Neurontin) 300 mg capsule Take 1 capsule (300 mg) by mouth 2 times a day. 1/22/24 4/21/24  Stefano Eugene PA-C   lancets misc 2 times a day. 6/10/15   Historical Provider, MD   levonorgestrel (Mirena) 21 " mcg/24 hours (8 yrs) 52 mg IUD 52 mg by intrauterine route.    Historical Provider, MD   lidocaine (Lidoderm) 5 % patch Place 1 patch over 12 hours on the skin once daily. Remove & discard patch within 12 hours or as directed by MD. 1/30/24   Mounika Serna,    lisinopril 40 mg tablet Take 1 tablet (40 mg) by mouth. 6/20/16   Historical Provider, MD   meloxicam (Mobic) 15 mg tablet Take by mouth. 2/6/23   Historical Provider, MD   metFORMIN 500 mg/5 mL suspension,extended rel recon Take 1,000 mg by mouth. 6/20/16   Historical Provider, MD   metformin HCl (METFORMIN ORAL) Take by mouth every 12 hours.    Historical Provider, MD   metroNIDAZOLE (Flagyl) 500 mg tablet Take 1 tablet (500 mg) by mouth twice a day. 7/6/23   Historical Provider, MD   miconazole (Micotin) 2 % vaginal cream INSERT VAGINALLY ONCE A NIGHT FOR 7 DAYS 11/3/23   Historical Provider, MD   montelukast sodium (MONTELUKAST ORAL) Take by mouth.    Historical Provider, MD   multivitamin (MULTIPLE VITAMINS ORAL) Take by mouth. 6/20/16   Historical Provider, MD   multivitamin (MULTIPLE VITAMINS ORAL) Take by mouth. 6/20/16   Historical Provider, MD   multivitamin tablet Take 1 tablet by mouth once daily. 6/20/16   Historical Provider, MD   naproxen (Naprosyn) 500 mg tablet Take 1 tablet (500 mg) by mouth. 1/24/23   Historical Provider, MD   neomycin-polymyxin-HC (Cortisporin) otic solution Administer into affected ear(s). 6/30/14   Historical Provider, MD   oxyCODONE-acetaminophen (Percocet) 2.5-325 mg tablet Take by mouth. 2/23/21   Historical Provider, MD   Ozempic 0.25 mg or 0.5 mg (2 mg/3 mL) pen injector INJECT 0.25MG UNDER THE SKIN ONCE WEEKLY 11/29/23   Historical Provider, MD   sertraline (Zoloft) 25 mg tablet Take 1 tablet (25 mg) by mouth once daily. 7/21/22   Historical Provider, MD   simvastatin (Zocor) 10 mg tablet Take by mouth. 5/27/21   Historical Provider, MD   simvastatin (Zocor) 40 mg tablet 1 tablet (40 mg). 2/4/24   Historical  "Provider, MD   SUMAtriptan (Imitrex) 100 mg tablet Take 1 tablet (100 mg) by mouth. 2/23/21   Historical Provider, MD   syringe with needle 3 mL 22 x 1 1/2\" syringe Syringe 22G X 1\" 3 ML inject B12 medication into your deltoid muscle once a month. Quantity: 1 Refills: 11 Lara Irizarry MD Start : 8-Feb-2019 Active 2/8/19   Historical Provider, MD   traMADol (Ultram) 50 mg tablet Take 1 tablet (50 mg) by mouth every 6 hours if needed. 1/19/24   Historical Provider, MD   walker misc front wheeled walker    Quantity: 1   Refills: 0   Ordered: 10-Feb-2021  Janet Moran Start: 10-Feb-2021  Generic Substitution Allowed 2/10/21   Historical Provider, MD BOLTON ROS:   Constitutional:   neg     no fever   no chills  Neuro/Psych:    Right hand/arm   numbness   weakness  Eyes:   neg    Ears:   neg    Nose:   neg    Mouth:   neg    Throat:   neg    Neck:   neg    Cardio:   neg    Respiratory:   neg    Endocrine:   neg    GI:   neg    :   neg    Musculoskeletal:    Back and neck pain   arthralgias  Hematologic:   neg     no history of blood transfusion   no blood clots  Skin:  neg      Physical Exam  Vitals reviewed.   Constitutional:       Appearance: Normal appearance.   HENT:      Head: Normocephalic and atraumatic.      Nose: Nose normal.      Mouth/Throat:      Mouth: Mucous membranes are moist.      Pharynx: Oropharynx is clear.   Eyes:      Extraocular Movements: Extraocular movements intact.      Pupils: Pupils are equal, round, and reactive to light.   Cardiovascular:      Rate and Rhythm: Normal rate and regular rhythm.      Heart sounds: Normal heart sounds.   Pulmonary:      Effort: Pulmonary effort is normal.      Breath sounds: Normal breath sounds.   Abdominal:      General: Abdomen is flat. Bowel sounds are normal.      Palpations: Abdomen is soft.   Musculoskeletal:         General: Normal range of motion.      Cervical back: Normal range of motion and neck supple.   Skin:     General: Skin is warm and " "dry.   Neurological:      Mental Status: She is alert and oriented to person, place, and time.      Comments: Right hand  weaker than left hand . States this has worsened with neck discomfort.    Psychiatric:         Mood and Affect: Mood normal.         Behavior: Behavior normal.         Thought Content: Thought content normal.         Judgment: Judgment normal.        PAT AIRWAY:   Airway:     Mallampati::  II    Neck ROM::  Full   States nothing loose or removable     Visit Vitals  /82   Pulse 70   Temp 35.8 °C (96.5 °F) (Temporal)     Visit Vitals  /82   Pulse 70   Temp 35.8 °C (96.5 °F) (Temporal)   Ht 1.549 m (5' 1\")   Wt 85.3 kg (188 lb 1.6 oz)   SpO2 98%   BMI 35.54 kg/m²   OB Status Postmenopausal   Smoking Status Never   BSA 1.92 m²     DASI Risk Score      Flowsheet Row Most Recent Value   DASI SCORE 32.2   METS Score (Will be calculated only when all the questions are answered) 6.7          Caprini DVT Assessment      Flowsheet Row Most Recent Value   DVT Score 9   Current Status Major surgery planned, lasting over 3 hours   Age 40-59 years   BMI 31-40 (Obesity)          Modified Frailty Index      Flowsheet Row Most Recent Value   Modified Frailty Index Calculator .1818          CHADS2 Stroke Risk  Current as of 47 minutes ago        N/A 3 to 100%: High Risk   2 to < 3%: Medium Risk   0 to < 2%: Low Risk     Last Change: N/A          This score determines the patient's risk of having a stroke if the patient has atrial fibrillation.        This score is not applicable to this patient. Components are not calculated.          Revised Cardiac Risk Index      Flowsheet Row Most Recent Value   Revised Cardiac Risk Calculator 0          Apfel Simplified Score      Flowsheet Row Most Recent Value   Apfel Simplified Score Calculator 3          Risk Analysis Index Results This Encounter    No data found in the last 1 encounters.       Stop Bang Score      Flowsheet Row Most Recent Value   Do " you snore loudly? 1   Do you often feel tired or fatigued after your sleep? 1   Has anyone ever observed you stop breathing in your sleep? 1   Do you have or are you being treated for high blood pressure? 1   Recent BMI (Calculated) 35.9   Is BMI greater than 35 kg/m2? 1=Yes   Age older than 50 years old? 1=Yes   Is your neck circumference greater than 17 inches (Male) or 16 inches (Female)? 0   Gender - Male 0=No   STOP-BANG Total Score 6          Assessment and Plan:     Neuro:   No diagnosis or significant findings on chart review or clinical presentation and evaluation.    Patient given information on brain exercises and delirium prevention.    HEENT/Airway  No diagnosis or significant findings on chart review or clinical presentation and evaluation.    Cardiovascular    Hypertension & hyperlipidemia  Currently taking atorvastatin.    BP controlled at 119/82  No recent lipid panel found    No complaints of chest pain or shortness of breath    CARDS EVAL  The patient is not followed by cardiology.    RCRI  The patient meets 0-1 RCRI criteria and therefore has a less than 1% risk of major adverse cardiac complications.  METS  The patient's functional capacity is greater than 4 METS.  MACE  30-day risk for MACE of 0 predictors, 3.9% risk for cardiac death, nonfatal myocardial infarction, and nonfatal cardiac arrest  THAI  0.1% risk for 26th to 50th percentile    16-19-24: EKG sinus rhythm     Pulmonary     Asthma  Currently treated with Advair Diskus. States no issues with shortness of breath    Sleep apnea  States no longer on cpap. Had gastric bypass surgery    STOP BANG Score of 6, which places patient at high risk for having ELIZABETH.  ARISCAT 26, Intermediate, 13.3% risk of in-hospital postoperative pulmonary complications  PRODIGY 8, intermediate of respiratory depression episode.    Patient given information on deep breathing exercises.     Renal    Nephrolithiasis   States no current issues or concerns.      Endocrine    Diabetes Evaluation  Currently taking Ozempic  6-19-24: A1c 5.6    Hematology  No diagnosis or significant findings on chart review or clinical presentation and evaluation.    Caprini score 9, high risk of VTE    Transfusion Evaluation  A type and screen was obtained given the likelihood for perioperative transfusion of blood or blood products.    Patient given information on DVT prevention.     GI  No diagnosis or significant findings on chart review or clinical presentation and evaluation.    Eat 10- 0  Apfel: 3 points 61% risk for post operative nausea/vomiting.     Genitourinary  No diagnosis or significant findings on chart review or clinical presentation and evaluation.    History of gastric bypass surgery    ID  No diagnosis or significant findings on chart review or clinical presentation and evaluation.    MRSA swab ordered  Chlorhexidine mouth wash and body wash ordered    Musculoskeletal    Spinal stenosis   Currently treating with lidocaine patch, gabapentin, diclofenac sodium gel, cyclobenzaprine, and amitriptyline     12-19-23: CT cervical spine  FINDINGS:     There is posterior fusion hardware at the upper cervical spine. On the right there is a screw at the occipital condyle which extends into the atlantooccipital joint and there is mild perihardware lucency at the screw. There are also right-sided C3 on C4 screws in the posterior bridging bar. On the left there are C3 and C4 screws and the more superior screw is at the lateral masses of and C1, also with a posterior bridging bar. There is lucency at the C1 screw. Hardware is intact. Resection of the posterior arch of C1 is noted.     There is moderate spondylosis. There is no significant listhesis. Mild irregularity at the dens is noted, possibly related to previous fracture. This would correlate with patient history.  There is a less than 6mm left apical lung nodule. No image followup is recommended per the current Fleischner  criteria.     IMPRESSION:   Findings concerning for loosening at the most superior screw on both sides.     -Preoperative medication instructions were provided and reviewed with the patient.  Any additional testing or evaluation was explained to the patient.  NPO Instructions were discussed, and the patient's questions were answered prior to conclusion of this encounter    Labs ordered:    Recent Results (from the past 168 hour(s))   Basic Metabolic Panel    Collection Time: 06/19/24  8:25 AM   Result Value Ref Range    Glucose 82 74 - 99 mg/dL    Sodium 142 136 - 145 mmol/L    Potassium 3.9 3.5 - 5.3 mmol/L    Chloride 106 98 - 107 mmol/L    Bicarbonate 27 21 - 32 mmol/L    Anion Gap 13 10 - 20 mmol/L    Urea Nitrogen 11 6 - 23 mg/dL    Creatinine 0.63 0.50 - 1.05 mg/dL    eGFR >90 >60 mL/min/1.73m*2    Calcium 9.5 8.6 - 10.6 mg/dL   CBC and Auto Differential    Collection Time: 06/19/24  8:25 AM   Result Value Ref Range    WBC 2.8 (L) 4.4 - 11.3 x10*3/uL    nRBC 0.0 0.0 - 0.0 /100 WBCs    RBC 4.49 4.00 - 5.20 x10*6/uL    Hemoglobin 12.4 12.0 - 16.0 g/dL    Hematocrit 38.6 36.0 - 46.0 %    MCV 86 80 - 100 fL    MCH 27.6 26.0 - 34.0 pg    MCHC 32.1 32.0 - 36.0 g/dL    RDW 12.6 11.5 - 14.5 %    Platelets 217 150 - 450 x10*3/uL    Neutrophils % 24.2 40.0 - 80.0 %    Immature Granulocytes %, Automated 0.0 0.0 - 0.9 %    Lymphocytes % 64.9 13.0 - 44.0 %    Monocytes % 8.3 2.0 - 10.0 %    Eosinophils % 2.2 0.0 - 6.0 %    Basophils % 0.4 0.0 - 2.0 %    Neutrophils Absolute 0.67 (L) 1.20 - 7.70 x10*3/uL    Immature Granulocytes Absolute, Automated 0.00 0.00 - 0.70 x10*3/uL    Lymphocytes Absolute 1.79 1.20 - 4.80 x10*3/uL    Monocytes Absolute 0.23 0.10 - 1.00 x10*3/uL    Eosinophils Absolute 0.06 0.00 - 0.70 x10*3/uL    Basophils Absolute 0.01 0.00 - 0.10 x10*3/uL   Coagulation Screen    Collection Time: 06/19/24  8:25 AM   Result Value Ref Range    Protime 11.0 9.8 - 12.8 seconds    INR 1.0 0.9 - 1.1    aPTT 34 27 - 38  seconds   Type And Screen    Collection Time: 06/19/24  8:25 AM   Result Value Ref Range    ABO TYPE AB     Rh TYPE POS     ANTIBODY SCREEN NEG    Staphylococcus aureus/MRSA colonization, Culture    Collection Time: 06/19/24  8:25 AM    Specimen: Anterior Nares; Swab   Result Value Ref Range    Staph/MRSA Screen Culture No Staphylococcus aureus isolated    Hemoglobin A1C    Collection Time: 06/19/24  8:25 AM   Result Value Ref Range    Hemoglobin A1C 5.6 see below %    Estimated Average Glucose 114 Not Established mg/dL   Morphology    Collection Time: 06/19/24  8:25 AM   Result Value Ref Range    RBC Morphology No significant RBC morphology present    ECG 12 lead    Collection Time: 06/20/24  9:27 AM   Result Value Ref Range    Ventricular Rate 65 BPM    Atrial Rate 65 BPM    OK Interval 138 ms    QRS Duration 88 ms    QT Interval 418 ms    QTC Calculation(Bazett) 434 ms    P Axis 47 degrees    R Axis -7 degrees    T Axis -13 degrees    QRS Count 11 beats    Q Onset 223 ms    P Onset 154 ms    P Offset 210 ms    T Offset 432 ms    QTC Fredericia 429 ms

## 2024-06-19 NOTE — PREPROCEDURE INSTRUCTIONS
Thank you for visiting [facility] ([facility]) today for your pre-procedure evaluation, you were seen by         Scarlet Orellana, MSN, NP-C, CNP  Family Nurse Practitioner  Department of Anesthesiology and Perioperative Medicine  Main phone: [phone]  Fax :[phone]      **Please be sure to follow any guidance provided by your surgeon regarding foods, fluids and medications prior to surgery**        This summary includes instructions and information to aid you during your perioperative period.  Please read carefully. If you have any questions about your visit today, please call the number listed above.  If you become ill or have any changes to your health before your surgery, please contact your primary care provider and alert your surgeon.    Preparing for your Surgery       Exercises  Preoperative Deep Breathing Exercises  Why it is important to do deep breathing exercises before my surgery?  Deep breathing exercises strengthen your breathing muscles.  This helps you to recover after your surgery and decreases the chance of breathing complications.  How are the deep breathing exercises done?  Sit straight with your back supported.  Breathe in deeply and slowly through your nose. Your lower rib cage should expand and your abdomen may move forward.  Hold that breath for 3 to 5 seconds.  Breathe out through pursed lips, slowly and completely.  Rest and repeat 10 times every hour while awake.  Rest longer if you become dizzy or lightheaded.          Preoperative Brain Exercises    What are brain exercises?  A brain exercise is any activity that engages your thinking (cognitive) skills.    What types of activities are considered brain exercises?  Jigsaw puzzles, crossword puzzles, word jumble, memory games, word search, and many more.  Many can be found free online or on your phone via a mobile mariano.    Why should I do brain exercises before my surgery?  More recent research has shown brain  exercise before surgery can lower the risk of postoperative delirium (confusion) which can be especially important for older adults.  Patients who did brain exercises for 5 to 10 hours the days before surgery, cut their risk of postoperative delirium in half up to 1 week after surgery.      Instructions    Preoperative Fasting Guidelines    Why must I stop eating and drinking near surgery time?  With sedation, food or liquid in your stomach can enter your lungs causing serious complications  Food can increase nausea and vomiting  When do I need to stop eating and drinking before my surgery?      Do not eat any food after midnight the night before your surgery/procedure. You may have up to 13.5 ounces of clear liquid until TWO hours before your instructed arrival time to the hospital.  This includes water, black tea/coffee, (no milk or cream) apple juice, and electrolyte drinks (Gatorade). You may chew gum until TWO hours before your surgery/procedure            Simple things you can do to help prevent blood clots     Blood clots are blockages that can form in the body's veins. When a blood clot forms in your deep veins, it may be called a deep vein thrombosis, or DVT for short. Blood clots can happen in any part of the body where blood flows, but they are most common in the arms and legs. If a piece of a blood clot breaks free and travels to the lungs, it is called a pulmonary embolus (PE). A PE can be a very serious problem.         Being in the hospital or having surgery can raise your chances of getting a blood clot because you may not be well enough to move around as much as you normally do.         Ways you can help prevent blood clots in the hospital       Wearing SCDs  SCDs stands for Sequential Compression Devices.   SCDs are special sleeves that wrap around your legs. They attach to a pump that fills them with air to gently squeeze your legs every few minutes.  This helps return the blood in your legs to  your heart.   SCDs should only be taken off when walking or bathing. SCDs may not be comfortable, but they can help save your life.              Pump SCD leg sleeves  Wearing compression stockings - if your doctor orders them. These special snug-fitting stockings gently squeeze your legs to help blood flow.       Walking. Walking helps move the blood in your legs.   If your doctor says it is ok, try walking the halls at least   5 times a day. Ask us to help you get up, so you don't fall.      Taking any blood-thinning medicines your doctor orders.              Ways you can help prevent blood clots at home         Wearing compression stockings - if your doctor orders them.   Walking - to help move the blood in your legs.    Taking any blood-thinning medicines your doctor orders.      Signs of a blood clot or PE    Tell your doctor or nurse right away if you have any of the problems listed below.         If you are at home, seek medical care right away. Call 911 for chest pain or problems breathing.            Signs of a blood clot (DVT) - such as pain, swelling, redness, or warmth in your arm or legs.  Signs of a pulmonary embolism (PE) - such as chest pain or feeling short of breath      Tobacco and Alcohol;  Do not drink alcohol or smoke within 24 hours of surgery.  It is best to quit smoking for as long as possible before any surgery or procedure.        The Week before Surgery        Seven days before Surgery  Check your CPM medication instructions  Do the exercises provided to you by CPM   Arrange for a responsible, adult licensed  to take you home after surgery and stay with you for 24 hours.  You will not be permitted to drive yourself home if you have received any anesthetic/sedation  Six days before surgery  Check your CPM medication instructions  Do the exercises provided to you by CPM   Start using Chlorhexidene (CHG) body wash if prescribed  Five days before surgery  Check your CPM medication  instructions  Do the exercises provided to you by CPM   Continue to use CHG body wash if prescribed  Three days before surgery  Check your CPM medication instructions  Do the exercises provided to you by CPM   Continue to use CHG body wash if prescribed  Two days before surgery  Check your CPM medication instructions  Do the exercises provided to you by CPM   Continue to use CHG body wash if prescribed    The Day before Surgery       Check your CPM medication and all other CPM instructions including when to stop eating and drinking  You will be called with your arrival time for surgery in the late afternoon.  If you do not receive a call please reach out to your surgeon's office.  Do not smoke or drink 24 hours before surgery  Prepare items to bring with you to the hospital  Shower with your chlorhexidine wash if prescribed  Brush your teeth and use your chlorhexidine dental rinse if prescribed    The Day of Surgery       Check your CPM medication instructions  Ensure you follow the instructions for when to stop eating and drinking  Shower, if prescribed use CHG.  Do not apply any lotions, creams, moisturizers, perfume or deodorant  Brush your teeth and use your CHG dental rinse if prescribed  Wear loose comfortable clothing  Avoid make-up  Remove  jewelry and piercings, consider professional piercing removal with a plastic spacer if needed  Bring photo ID and Insurance card  Bring an accurate medication list that includes medication dose, frequency and allergies  Bring a copy of your advanced directives (will, health care power of )  Bring any devices and controllers as well as medical devices you have been provided with for surgery (CPAP, slings, braces, etc.)  Dentures, eyeglasses, and contacts will be removed before surgery, please bring cases for contacts or glasses

## 2024-06-20 LAB
ATRIAL RATE: 65 BPM
P AXIS: 47 DEGREES
P OFFSET: 210 MS
P ONSET: 154 MS
PR INTERVAL: 138 MS
Q ONSET: 223 MS
QRS COUNT: 11 BEATS
QRS DURATION: 88 MS
QT INTERVAL: 418 MS
QTC CALCULATION(BAZETT): 434 MS
QTC FREDERICIA: 429 MS
R AXIS: -7 DEGREES
T AXIS: -13 DEGREES
T OFFSET: 432 MS
VENTRICULAR RATE: 65 BPM

## 2024-06-20 PROCEDURE — 93005 ELECTROCARDIOGRAM TRACING: CPT

## 2024-06-21 LAB — STAPHYLOCOCCUS SPEC CULT: NORMAL

## 2024-07-01 ENCOUNTER — ANESTHESIA EVENT (OUTPATIENT)
Dept: OPERATING ROOM | Facility: HOSPITAL | Age: 59
End: 2024-07-01
Payer: MEDICARE

## 2024-07-02 ENCOUNTER — HOSPITAL ENCOUNTER (OUTPATIENT)
Facility: HOSPITAL | Age: 59
Discharge: HOME | End: 2024-07-03
Attending: ORTHOPAEDIC SURGERY | Admitting: ORTHOPAEDIC SURGERY
Payer: MEDICARE

## 2024-07-02 ENCOUNTER — APPOINTMENT (OUTPATIENT)
Dept: RADIOLOGY | Facility: HOSPITAL | Age: 59
End: 2024-07-02
Payer: MEDICARE

## 2024-07-02 ENCOUNTER — ANESTHESIA (OUTPATIENT)
Dept: OPERATING ROOM | Facility: HOSPITAL | Age: 59
End: 2024-07-02
Payer: MEDICARE

## 2024-07-02 DIAGNOSIS — M54.12 CERVICAL RADICULOPATHY: Primary | ICD-10-CM

## 2024-07-02 DIAGNOSIS — M54.12 CERVICAL RADICULITIS: ICD-10-CM

## 2024-07-02 LAB
GLUCOSE BLD MANUAL STRIP-MCNC: 110 MG/DL (ref 74–99)
GLUCOSE BLD MANUAL STRIP-MCNC: 228 MG/DL (ref 74–99)
GLUCOSE BLD MANUAL STRIP-MCNC: 97 MG/DL (ref 74–99)

## 2024-07-02 PROCEDURE — 2500000004 HC RX 250 GENERAL PHARMACY W/ HCPCS (ALT 636 FOR OP/ED): Mod: SE | Performed by: STUDENT IN AN ORGANIZED HEALTH CARE EDUCATION/TRAINING PROGRAM

## 2024-07-02 PROCEDURE — 7100000011 HC EXTENDED STAY RECOVERY HOURLY - NURSING UNIT

## 2024-07-02 PROCEDURE — 2780000003 HC OR 278 NO HCPCS: Performed by: ORTHOPAEDIC SURGERY

## 2024-07-02 PROCEDURE — 22856 TOT DISC ARTHRP 1NTRSPC CRV: CPT | Performed by: ORTHOPAEDIC SURGERY

## 2024-07-02 PROCEDURE — 82947 ASSAY GLUCOSE BLOOD QUANT: CPT

## 2024-07-02 PROCEDURE — 3700000002 HC GENERAL ANESTHESIA TIME - EACH INCREMENTAL 1 MINUTE: Performed by: ORTHOPAEDIC SURGERY

## 2024-07-02 PROCEDURE — 2500000004 HC RX 250 GENERAL PHARMACY W/ HCPCS (ALT 636 FOR OP/ED): Mod: SE

## 2024-07-02 PROCEDURE — 2720000007 HC OR 272 NO HCPCS: Performed by: ORTHOPAEDIC SURGERY

## 2024-07-02 PROCEDURE — 2500000005 HC RX 250 GENERAL PHARMACY W/O HCPCS: Mod: SE | Performed by: ORTHOPAEDIC SURGERY

## 2024-07-02 PROCEDURE — C1776 JOINT DEVICE (IMPLANTABLE): HCPCS | Performed by: ORTHOPAEDIC SURGERY

## 2024-07-02 PROCEDURE — 7100000002 HC RECOVERY ROOM TIME - EACH INCREMENTAL 1 MINUTE: Performed by: ORTHOPAEDIC SURGERY

## 2024-07-02 PROCEDURE — 3600000004 HC OR TIME - INITIAL BASE CHARGE - PROCEDURE LEVEL FOUR: Performed by: ORTHOPAEDIC SURGERY

## 2024-07-02 PROCEDURE — 2500000005 HC RX 250 GENERAL PHARMACY W/O HCPCS: Mod: SE

## 2024-07-02 PROCEDURE — 3700000001 HC GENERAL ANESTHESIA TIME - INITIAL BASE CHARGE: Performed by: ORTHOPAEDIC SURGERY

## 2024-07-02 PROCEDURE — 2500000001 HC RX 250 WO HCPCS SELF ADMINISTERED DRUGS (ALT 637 FOR MEDICARE OP): Mod: SE | Performed by: STUDENT IN AN ORGANIZED HEALTH CARE EDUCATION/TRAINING PROGRAM

## 2024-07-02 PROCEDURE — 7100000001 HC RECOVERY ROOM TIME - INITIAL BASE CHARGE: Performed by: ORTHOPAEDIC SURGERY

## 2024-07-02 PROCEDURE — 3600000009 HC OR TIME - EACH INCREMENTAL 1 MINUTE - PROCEDURE LEVEL FOUR: Performed by: ORTHOPAEDIC SURGERY

## 2024-07-02 DEVICE — IMPLANTABLE DEVICE: Type: IMPLANTABLE DEVICE | Site: SPINE CERVICAL | Status: FUNCTIONAL

## 2024-07-02 RX ORDER — OXYCODONE HYDROCHLORIDE 5 MG/1
10 TABLET ORAL EVERY 4 HOURS PRN
Status: DISCONTINUED | OUTPATIENT
Start: 2024-07-02 | End: 2024-07-02 | Stop reason: HOSPADM

## 2024-07-02 RX ORDER — HYDROMORPHONE HYDROCHLORIDE 1 MG/ML
0.5 INJECTION, SOLUTION INTRAMUSCULAR; INTRAVENOUS; SUBCUTANEOUS EVERY 5 MIN PRN
Status: DISCONTINUED | OUTPATIENT
Start: 2024-07-02 | End: 2024-07-02 | Stop reason: HOSPADM

## 2024-07-02 RX ORDER — NALOXONE HYDROCHLORIDE 0.4 MG/ML
0.2 INJECTION, SOLUTION INTRAMUSCULAR; INTRAVENOUS; SUBCUTANEOUS EVERY 5 MIN PRN
Status: DISCONTINUED | OUTPATIENT
Start: 2024-07-02 | End: 2024-07-03 | Stop reason: HOSPADM

## 2024-07-02 RX ORDER — ACETAMINOPHEN 325 MG/1
650 TABLET ORAL EVERY 6 HOURS
Status: DISCONTINUED | OUTPATIENT
Start: 2024-07-02 | End: 2024-07-03 | Stop reason: HOSPADM

## 2024-07-02 RX ORDER — HYDROMORPHONE HYDROCHLORIDE 1 MG/ML
INJECTION, SOLUTION INTRAMUSCULAR; INTRAVENOUS; SUBCUTANEOUS AS NEEDED
Status: DISCONTINUED | OUTPATIENT
Start: 2024-07-02 | End: 2024-07-02

## 2024-07-02 RX ORDER — SODIUM CHLORIDE, SODIUM LACTATE, POTASSIUM CHLORIDE, CALCIUM CHLORIDE 600; 310; 30; 20 MG/100ML; MG/100ML; MG/100ML; MG/100ML
INJECTION, SOLUTION INTRAVENOUS CONTINUOUS PRN
Status: DISCONTINUED | OUTPATIENT
Start: 2024-07-02 | End: 2024-07-02

## 2024-07-02 RX ORDER — METOCLOPRAMIDE HYDROCHLORIDE 5 MG/ML
10 INJECTION INTRAMUSCULAR; INTRAVENOUS EVERY 6 HOURS PRN
Status: DISCONTINUED | OUTPATIENT
Start: 2024-07-02 | End: 2024-07-03 | Stop reason: HOSPADM

## 2024-07-02 RX ORDER — HYDROMORPHONE HYDROCHLORIDE 1 MG/ML
0.2 INJECTION, SOLUTION INTRAMUSCULAR; INTRAVENOUS; SUBCUTANEOUS EVERY 5 MIN PRN
Status: DISCONTINUED | OUTPATIENT
Start: 2024-07-02 | End: 2024-07-02 | Stop reason: HOSPADM

## 2024-07-02 RX ORDER — OXYCODONE HYDROCHLORIDE 5 MG/1
10 TABLET ORAL EVERY 4 HOURS PRN
Status: DISCONTINUED | OUTPATIENT
Start: 2024-07-02 | End: 2024-07-03 | Stop reason: HOSPADM

## 2024-07-02 RX ORDER — SODIUM CHLORIDE, SODIUM LACTATE, POTASSIUM CHLORIDE, CALCIUM CHLORIDE 600; 310; 30; 20 MG/100ML; MG/100ML; MG/100ML; MG/100ML
100 INJECTION, SOLUTION INTRAVENOUS CONTINUOUS
Status: DISCONTINUED | OUTPATIENT
Start: 2024-07-02 | End: 2024-07-03 | Stop reason: HOSPADM

## 2024-07-02 RX ORDER — ONDANSETRON HYDROCHLORIDE 2 MG/ML
4 INJECTION, SOLUTION INTRAVENOUS ONCE AS NEEDED
Status: DISCONTINUED | OUTPATIENT
Start: 2024-07-02 | End: 2024-07-02 | Stop reason: HOSPADM

## 2024-07-02 RX ORDER — DEXTROSE 50 % IN WATER (D50W) INTRAVENOUS SYRINGE
25
Status: DISCONTINUED | OUTPATIENT
Start: 2024-07-02 | End: 2024-07-03 | Stop reason: HOSPADM

## 2024-07-02 RX ORDER — ONDANSETRON HYDROCHLORIDE 2 MG/ML
4 INJECTION, SOLUTION INTRAVENOUS EVERY 8 HOURS PRN
Status: DISCONTINUED | OUTPATIENT
Start: 2024-07-02 | End: 2024-07-03 | Stop reason: HOSPADM

## 2024-07-02 RX ORDER — MIDAZOLAM HYDROCHLORIDE 1 MG/ML
INJECTION INTRAMUSCULAR; INTRAVENOUS AS NEEDED
Status: DISCONTINUED | OUTPATIENT
Start: 2024-07-02 | End: 2024-07-02

## 2024-07-02 RX ORDER — OXYCODONE HYDROCHLORIDE 5 MG/1
5 TABLET ORAL EVERY 4 HOURS PRN
Status: DISCONTINUED | OUTPATIENT
Start: 2024-07-02 | End: 2024-07-03 | Stop reason: HOSPADM

## 2024-07-02 RX ORDER — POLYETHYLENE GLYCOL 3350 17 G/17G
17 POWDER, FOR SOLUTION ORAL DAILY
Status: DISCONTINUED | OUTPATIENT
Start: 2024-07-02 | End: 2024-07-03 | Stop reason: HOSPADM

## 2024-07-02 RX ORDER — PHENYLEPHRINE HCL IN 0.9% NACL 0.4MG/10ML
SYRINGE (ML) INTRAVENOUS AS NEEDED
Status: DISCONTINUED | OUTPATIENT
Start: 2024-07-02 | End: 2024-07-02

## 2024-07-02 RX ORDER — DOCUSATE SODIUM 100 MG/1
100 CAPSULE, LIQUID FILLED ORAL 2 TIMES DAILY
Status: DISCONTINUED | OUTPATIENT
Start: 2024-07-02 | End: 2024-07-03 | Stop reason: HOSPADM

## 2024-07-02 RX ORDER — FENTANYL CITRATE 50 UG/ML
INJECTION, SOLUTION INTRAMUSCULAR; INTRAVENOUS AS NEEDED
Status: DISCONTINUED | OUTPATIENT
Start: 2024-07-02 | End: 2024-07-02

## 2024-07-02 RX ORDER — LIDOCAINE HYDROCHLORIDE 20 MG/ML
INJECTION, SOLUTION INFILTRATION; PERINEURAL AS NEEDED
Status: DISCONTINUED | OUTPATIENT
Start: 2024-07-02 | End: 2024-07-02

## 2024-07-02 RX ORDER — CEFAZOLIN SODIUM 2 G/100ML
2 INJECTION, SOLUTION INTRAVENOUS EVERY 8 HOURS
Status: COMPLETED | OUTPATIENT
Start: 2024-07-02 | End: 2024-07-03

## 2024-07-02 RX ORDER — ALBUTEROL SULFATE 0.83 MG/ML
2.5 SOLUTION RESPIRATORY (INHALATION) ONCE AS NEEDED
Status: DISCONTINUED | OUTPATIENT
Start: 2024-07-02 | End: 2024-07-02 | Stop reason: HOSPADM

## 2024-07-02 RX ORDER — ROCURONIUM BROMIDE 10 MG/ML
INJECTION, SOLUTION INTRAVENOUS AS NEEDED
Status: DISCONTINUED | OUTPATIENT
Start: 2024-07-02 | End: 2024-07-02

## 2024-07-02 RX ORDER — SODIUM CHLORIDE, SODIUM LACTATE, POTASSIUM CHLORIDE, CALCIUM CHLORIDE 600; 310; 30; 20 MG/100ML; MG/100ML; MG/100ML; MG/100ML
100 INJECTION, SOLUTION INTRAVENOUS CONTINUOUS
Status: DISCONTINUED | OUTPATIENT
Start: 2024-07-02 | End: 2024-07-02 | Stop reason: HOSPADM

## 2024-07-02 RX ORDER — ONDANSETRON 4 MG/1
4 TABLET, ORALLY DISINTEGRATING ORAL EVERY 8 HOURS PRN
Status: DISCONTINUED | OUTPATIENT
Start: 2024-07-02 | End: 2024-07-03 | Stop reason: HOSPADM

## 2024-07-02 RX ORDER — PROPOFOL 10 MG/ML
INJECTION, EMULSION INTRAVENOUS AS NEEDED
Status: DISCONTINUED | OUTPATIENT
Start: 2024-07-02 | End: 2024-07-02

## 2024-07-02 RX ORDER — DEXTROSE 50 % IN WATER (D50W) INTRAVENOUS SYRINGE
12.5
Status: DISCONTINUED | OUTPATIENT
Start: 2024-07-02 | End: 2024-07-03 | Stop reason: HOSPADM

## 2024-07-02 RX ORDER — HYDROMORPHONE HYDROCHLORIDE 1 MG/ML
0.5 INJECTION, SOLUTION INTRAMUSCULAR; INTRAVENOUS; SUBCUTANEOUS EVERY 4 HOURS PRN
Status: DISCONTINUED | OUTPATIENT
Start: 2024-07-02 | End: 2024-07-03 | Stop reason: HOSPADM

## 2024-07-02 RX ORDER — METHOCARBAMOL 100 MG/ML
1000 INJECTION, SOLUTION INTRAMUSCULAR; INTRAVENOUS ONCE
Status: COMPLETED | OUTPATIENT
Start: 2024-07-02 | End: 2024-07-02

## 2024-07-02 RX ORDER — DROPERIDOL 2.5 MG/ML
0.62 INJECTION, SOLUTION INTRAMUSCULAR; INTRAVENOUS ONCE AS NEEDED
Status: DISCONTINUED | OUTPATIENT
Start: 2024-07-02 | End: 2024-07-02 | Stop reason: HOSPADM

## 2024-07-02 RX ORDER — METHOCARBAMOL 500 MG/1
1000 TABLET, FILM COATED ORAL 3 TIMES DAILY
Status: DISCONTINUED | OUTPATIENT
Start: 2024-07-02 | End: 2024-07-03 | Stop reason: HOSPADM

## 2024-07-02 RX ORDER — DIPHENHYDRAMINE HYDROCHLORIDE 50 MG/ML
12.5 INJECTION INTRAMUSCULAR; INTRAVENOUS EVERY 6 HOURS PRN
Status: DISCONTINUED | OUTPATIENT
Start: 2024-07-02 | End: 2024-07-03 | Stop reason: HOSPADM

## 2024-07-02 RX ORDER — CEFAZOLIN 1 G/1
INJECTION, POWDER, FOR SOLUTION INTRAVENOUS AS NEEDED
Status: DISCONTINUED | OUTPATIENT
Start: 2024-07-02 | End: 2024-07-02

## 2024-07-02 RX ORDER — OXYCODONE HYDROCHLORIDE 5 MG/1
5 TABLET ORAL EVERY 4 HOURS PRN
Status: DISCONTINUED | OUTPATIENT
Start: 2024-07-02 | End: 2024-07-02 | Stop reason: HOSPADM

## 2024-07-02 RX ORDER — GLYCOPYRROLATE 0.2 MG/ML
INJECTION INTRAMUSCULAR; INTRAVENOUS AS NEEDED
Status: DISCONTINUED | OUTPATIENT
Start: 2024-07-02 | End: 2024-07-02

## 2024-07-02 RX ORDER — ACETAMINOPHEN 325 MG/1
650 TABLET ORAL EVERY 4 HOURS PRN
Status: DISCONTINUED | OUTPATIENT
Start: 2024-07-02 | End: 2024-07-02 | Stop reason: HOSPADM

## 2024-07-02 RX ORDER — METOCLOPRAMIDE 10 MG/1
10 TABLET ORAL EVERY 6 HOURS PRN
Status: DISCONTINUED | OUTPATIENT
Start: 2024-07-02 | End: 2024-07-03 | Stop reason: HOSPADM

## 2024-07-02 RX ORDER — ATORVASTATIN CALCIUM 40 MG/1
40 TABLET, FILM COATED ORAL NIGHTLY
Status: DISCONTINUED | OUTPATIENT
Start: 2024-07-02 | End: 2024-07-03 | Stop reason: HOSPADM

## 2024-07-02 SDOH — SOCIAL STABILITY: SOCIAL INSECURITY: ARE THERE ANY APPARENT SIGNS OF INJURIES/BEHAVIORS THAT COULD BE RELATED TO ABUSE/NEGLECT?: NO

## 2024-07-02 SDOH — SOCIAL STABILITY: SOCIAL INSECURITY: WERE YOU ABLE TO COMPLETE ALL THE BEHAVIORAL HEALTH SCREENINGS?: YES

## 2024-07-02 SDOH — HEALTH STABILITY: MENTAL HEALTH: HOW MANY STANDARD DRINKS CONTAINING ALCOHOL DO YOU HAVE ON A TYPICAL DAY?: PATIENT DOES NOT DRINK

## 2024-07-02 SDOH — SOCIAL STABILITY: SOCIAL INSECURITY: HAVE YOU HAD THOUGHTS OF HARMING ANYONE ELSE?: NO

## 2024-07-02 SDOH — HEALTH STABILITY: MENTAL HEALTH: HOW OFTEN DO YOU HAVE A DRINK CONTAINING ALCOHOL?: NEVER

## 2024-07-02 SDOH — HEALTH STABILITY: MENTAL HEALTH
HOW OFTEN DO YOU NEED TO HAVE SOMEONE HELP YOU WHEN YOU READ INSTRUCTIONS, PAMPHLETS, OR OTHER WRITTEN MATERIAL FROM YOUR DOCTOR OR PHARMACY?: NEVER

## 2024-07-02 SDOH — SOCIAL STABILITY: SOCIAL INSECURITY: DO YOU FEEL ANYONE HAS EXPLOITED OR TAKEN ADVANTAGE OF YOU FINANCIALLY OR OF YOUR PERSONAL PROPERTY?: NO

## 2024-07-02 SDOH — SOCIAL STABILITY: SOCIAL INSECURITY: ABUSE: ADULT

## 2024-07-02 SDOH — ECONOMIC STABILITY: INCOME INSECURITY: IN THE PAST 12 MONTHS, HAS THE ELECTRIC, GAS, OIL, OR WATER COMPANY THREATENED TO SHUT OFF SERVICE IN YOUR HOME?: NO

## 2024-07-02 SDOH — HEALTH STABILITY: MENTAL HEALTH: HOW OFTEN DO YOU HAVE 6 OR MORE DRINKS ON ONE OCCASION?: NEVER

## 2024-07-02 SDOH — SOCIAL STABILITY: SOCIAL INSECURITY: ARE YOU OR HAVE YOU BEEN THREATENED OR ABUSED PHYSICALLY, EMOTIONALLY, OR SEXUALLY BY ANYONE?: NO

## 2024-07-02 SDOH — SOCIAL STABILITY: SOCIAL INSECURITY: HAVE YOU HAD ANY THOUGHTS OF HARMING ANYONE ELSE?: NO

## 2024-07-02 SDOH — SOCIAL STABILITY: SOCIAL INSECURITY: DOES ANYONE TRY TO KEEP YOU FROM HAVING/CONTACTING OTHER FRIENDS OR DOING THINGS OUTSIDE YOUR HOME?: NO

## 2024-07-02 SDOH — SOCIAL STABILITY: SOCIAL INSECURITY: DO YOU FEEL UNSAFE GOING BACK TO THE PLACE WHERE YOU ARE LIVING?: NO

## 2024-07-02 ASSESSMENT — ACTIVITIES OF DAILY LIVING (ADL)
WALKS IN HOME: INDEPENDENT
HEARING - RIGHT EAR: FUNCTIONAL
PATIENT'S MEMORY ADEQUATE TO SAFELY COMPLETE DAILY ACTIVITIES?: YES
GROOMING: INDEPENDENT
BATHING: INDEPENDENT
DRESSING YOURSELF: INDEPENDENT
ADEQUATE_TO_COMPLETE_ADL: YES
LACK_OF_TRANSPORTATION: NO
JUDGMENT_ADEQUATE_SAFELY_COMPLETE_DAILY_ACTIVITIES: YES
FEEDING YOURSELF: INDEPENDENT
TOILETING: INDEPENDENT
HEARING - LEFT EAR: FUNCTIONAL

## 2024-07-02 ASSESSMENT — COGNITIVE AND FUNCTIONAL STATUS - GENERAL
MOBILITY SCORE: 24
PATIENT BASELINE BEDBOUND: NO
DAILY ACTIVITIY SCORE: 24
MOBILITY SCORE: 24
DAILY ACTIVITIY SCORE: 24

## 2024-07-02 ASSESSMENT — PATIENT HEALTH QUESTIONNAIRE - PHQ9
1. LITTLE INTEREST OR PLEASURE IN DOING THINGS: NOT AT ALL
SUM OF ALL RESPONSES TO PHQ9 QUESTIONS 1 & 2: 0
2. FEELING DOWN, DEPRESSED OR HOPELESS: NOT AT ALL

## 2024-07-02 ASSESSMENT — PAIN - FUNCTIONAL ASSESSMENT
PAIN_FUNCTIONAL_ASSESSMENT: 0-10
PAIN_FUNCTIONAL_ASSESSMENT: 0-10
PAIN_FUNCTIONAL_ASSESSMENT: UNABLE TO SELF-REPORT
PAIN_FUNCTIONAL_ASSESSMENT: UNABLE TO SELF-REPORT
PAIN_FUNCTIONAL_ASSESSMENT: 0-10
PAIN_FUNCTIONAL_ASSESSMENT: UNABLE TO SELF-REPORT
PAIN_FUNCTIONAL_ASSESSMENT: 0-10
PAIN_FUNCTIONAL_ASSESSMENT: UNABLE TO SELF-REPORT
PAIN_FUNCTIONAL_ASSESSMENT: 0-10
PAIN_FUNCTIONAL_ASSESSMENT: UNABLE TO SELF-REPORT
PAIN_FUNCTIONAL_ASSESSMENT: UNABLE TO SELF-REPORT
PAIN_FUNCTIONAL_ASSESSMENT: 0-10
PAIN_FUNCTIONAL_ASSESSMENT: UNABLE TO SELF-REPORT
PAIN_FUNCTIONAL_ASSESSMENT: UNABLE TO SELF-REPORT
PAIN_FUNCTIONAL_ASSESSMENT: 0-10

## 2024-07-02 ASSESSMENT — PAIN SCALES - GENERAL
PAINLEVEL_OUTOF10: 0 - NO PAIN
PAINLEVEL_OUTOF10: 8
PAINLEVEL_OUTOF10: 9
PAINLEVEL_OUTOF10: 8
PAINLEVEL_OUTOF10: 9
PAINLEVEL_OUTOF10: 0 - NO PAIN

## 2024-07-02 ASSESSMENT — COLUMBIA-SUICIDE SEVERITY RATING SCALE - C-SSRS
1. IN THE PAST MONTH, HAVE YOU WISHED YOU WERE DEAD OR WISHED YOU COULD GO TO SLEEP AND NOT WAKE UP?: NO
2. HAVE YOU ACTUALLY HAD ANY THOUGHTS OF KILLING YOURSELF?: NO
2. HAVE YOU ACTUALLY HAD ANY THOUGHTS OF KILLING YOURSELF?: NO
6. HAVE YOU EVER DONE ANYTHING, STARTED TO DO ANYTHING, OR PREPARED TO DO ANYTHING TO END YOUR LIFE?: NO
1. IN THE PAST MONTH, HAVE YOU WISHED YOU WERE DEAD OR WISHED YOU COULD GO TO SLEEP AND NOT WAKE UP?: NO
6. HAVE YOU EVER DONE ANYTHING, STARTED TO DO ANYTHING, OR PREPARED TO DO ANYTHING TO END YOUR LIFE?: NO

## 2024-07-02 ASSESSMENT — LIFESTYLE VARIABLES
SKIP TO QUESTIONS 9-10: 1
SKIP TO QUESTIONS 9-10: 1
HOW OFTEN DO YOU HAVE A DRINK CONTAINING ALCOHOL: NEVER
SUBSTANCE_ABUSE_PAST_12_MONTHS: NO
AUDIT-C TOTAL SCORE: 0
HOW OFTEN DO YOU HAVE 6 OR MORE DRINKS ON ONE OCCASION: NEVER
AUDIT-C TOTAL SCORE: 0
AUDIT-C TOTAL SCORE: 0
HOW MANY STANDARD DRINKS CONTAINING ALCOHOL DO YOU HAVE ON A TYPICAL DAY: PATIENT DOES NOT DRINK
PRESCIPTION_ABUSE_PAST_12_MONTHS: NO

## 2024-07-02 ASSESSMENT — PAIN DESCRIPTION - DESCRIPTORS: DESCRIPTORS: SORE

## 2024-07-02 NOTE — ANESTHESIA POSTPROCEDURE EVALUATION
Patient: Caron Hobson    Procedure Summary       Date: 07/02/24 Room / Location: [facility] OR 06 / [facility]    Anesthesia Start: 0923 Anesthesia Stop: 1056    Procedure: C5-6 anterior cervical decompression and disc replacement, possible fusion (Spine Cervical) Diagnosis:       Cervical radiculitis      (Cervical radiculitis [M54.12])    Surgeons: Escobar Coe MD Responsible Provider: Anton Wade DO    Anesthesia Type: general ASA Status: 3            Anesthesia Type: general    Vitals Value Taken Time   /77 07/02/24 1115   Temp 36.5 07/02/24 1120   Pulse 76 07/02/24 1116   Resp 13 07/02/24 1116   SpO2 98 % 07/02/24 1116   Vitals shown include unfiled device data.    Anesthesia Post Evaluation    Patient location during evaluation: PACU  Patient participation: complete - patient participated  Level of consciousness: awake  Pain management: adequate  Multimodal analgesia pain management approach  Airway patency: patent  Two or more strategies used to mitigate risk of obstructive sleep apnea  Cardiovascular status: acceptable  Respiratory status: face mask  Hydration status: acceptable  Postoperative Nausea and Vomiting: none        There were no known notable events for this encounter.

## 2024-07-02 NOTE — ANESTHESIA PREPROCEDURE EVALUATION
Patient: Caron Hobson    Procedure Information       Date/Time: 07/02/24 0805    Procedure: C5-6 anterior cervical decompression and disc replacement, possible fusion (Spine Cervical)    Location: [facility] OR 06 / [facility] OR    Surgeons: Escobar Coe MD            Relevant Problems   Anesthesia (within normal limits)      Cardiac   (+) Essential hypertension, benign   (+) Hypercholesteremia   (+) Hypercholesterolemia      Pulmonary   (+) Asthma (HHS-HCC)   (+) Chronic obstructive pulmonary disease (Multi)   (+) Mild intermittent asthma without complication (HHS-HCC)   (+) Obstructive sleep apnea syndrome      Neuro   (+) Bilateral occipital neuralgia   (+) Cervical radiculitis   (+) Complex regional pain syndrome affecting both upper arms   (+) Depression, major, recurrent (CMS-HCC)   (+) Depressive disorder      Liver   (+) Calculus of gallbladder   (+) NAFLD (nonalcoholic fatty liver disease)      Endocrine   (+) Obesity   (+) Type 2 diabetes mellitus (Multi)   (+) Type 2 diabetes mellitus without complication (Multi)      Musculoskeletal   (+) Algodystrophic syndrome   (+) Arthritis of knee, degenerative   (+) Complex regional pain syndrome affecting both upper arms   (+) Osteoarthritis of knee       Clinical information reviewed:   Tobacco  Allergies  Meds   Med Hx  Surg Hx  OB Status  Fam Hx  Soc   Hx        NPO Detail:  NPO/Void Status  Date of Last Liquid: 07/01/24  Time of Last Liquid: 2300  Date of Last Solid: 07/01/24  Time of Last Solid: 2300  Last Intake Type: Clear fluids         Physical Exam    Airway  Mallampati: II  TM distance: >3 FB  Neck ROM: limited     Cardiovascular - normal exam     Dental - normal exam     Pulmonary - normal exam  Breath sounds clear to auscultation     Abdominal - normal exam  (+) obese             Anesthesia Plan    History of general anesthesia?: yes  History of complications of general anesthesia?: no    ASA 3     general     intravenous  induction   Postoperative administration of opioids is intended.  Trial extubation is planned.  Anesthetic plan and risks discussed with patient.    Plan discussed with CAA.

## 2024-07-02 NOTE — DISCHARGE INSTR - ACTIVITY
-Activity as tolerated.   -Progressively walking at least 2 times per day.   -No pushing, pulling, or lifting objects greater than 10 pounds until follow up appointment.   -You may start showering once the incision has been dry for 48 hours.  Use soap lightly, rinse,       and pat dry.   -You may not return to work until your follow up appointment  -You may not drive until your follow up appointment, or while taking narcotic medication

## 2024-07-02 NOTE — ANESTHESIA PROCEDURE NOTES
Airway  Date/Time: 7/2/2024 9:37 AM  Urgency: elective    Airway not difficult    Staffing  Performed: RONAK   Authorized by: Anton Wade DO    Performed by: RONAK Rogel  Patient location during procedure: OR    Indications and Patient Condition  Indications for airway management: anesthesia  Spontaneous Ventilation: absent  Sedation level: deep  Preoxygenated: yes  Patient position: sniffing  MILS maintained throughout  Mask difficulty assessment: 1 - vent by mask    Final Airway Details  Final airway type: endotracheal airway      Successful airway: ETT  Cuffed: yes   Successful intubation technique: video laryngoscopy  Facilitating devices/methods: intubating stylet  Endotracheal tube insertion site: oral  Blade type: Insighter medium blade.  ETT size (mm): 7.0  Cormack-Lehane Classification: grade I - full view of glottis  Placement verified by: chest auscultation and capnometry   Measured from: lips  ETT to lips (cm): 19  Number of attempts at approach: 1

## 2024-07-02 NOTE — PROGRESS NOTES
Orthopaedic Surgery Progress Note    S:  Seen in pacu postop. Resting comfortably.    O:  /73   Pulse 76   Temp 36 °C (96.8 °F) (Temporal)   Resp 18   SpO2 99%     Gen: arousable, NAD, appropriately conversational  Cardiac: RRR to peripheral palpation  Resp: nonlabored  GI: soft, nondistended    MSK:  Spine Exam:  Appropriate postoperative tenderness  Surgical dressing CDI w/o strikethru  RENEA Drain in place holding suction, serosanginous output  Soft collar in place    Full motor/sensation exam could not be performed 2/2 recent anesthesia    A/P: 59 y.o. female now s/p C5-6 disc arthroplasty on 7/2/2024 with Dr. Coe.  Doing well postop.    Plan:  - Weight bearing:  WBAT, no head of bed restrictions but no heavy lifting, bending, or twisting  - DVT ppx: SCDs, hold dvt chemo ppx in setting of recent spine surgery  - Diet: Regular  - Pain: Tylenol, oxycodone 5/10, PRN dilaudid; PRN robaxin for muscle spasms; no toradol due to hx of gastric bypass  - Antibiotics: perioperative ancef 2g q8hr x3 doses  - FEN: HLIV with good PO intake  - Bowel Regimen: Colace, senna, dulcolax  - PT/OT  - Pulm: Encourage IS  - Continue home medications  - No ortiz  - Soft collar  - RENEA drain x1, will dc on POD1    Dispo: pending PT/OT    Erik Pardo MD  Orthopedic Surgery, PGY4  Available by Epic Message    While inpatient, this patient will be followed by the Orthopaedic Spine team. Please see contact information below:    Ladonna Fischer MD PGY2  Erik Pardo MD PGY4

## 2024-07-02 NOTE — OP NOTE
C5-6 anterior cervical decompression and disc replacement, possible fusion Operative Note     Date: 2024  OR Location: [facility] OR    Name: Caron Hobson YOB: 1965, Age: 59 y.o., MRN: [id number], Sex: female    Diagnosis  Pre-op Diagnosis     * Cervical radiculitis [M54.12] Post-op Diagnosis     * Cervical radiculitis [M54.12]     Procedures  C5-6 anterior cervical decompression and disc replacement, possible fusion  10693 - [place] TOTAL DISC ARTHRP ANT SINGLE INTERSPACE CERVICAL      Surgeons      * Escobar Coe - Primary    Resident/Fellow/Other Assistant:  Surgeons and Role:     * Erik Pardo MD - Resident - Assisting    Procedure Summary  Anesthesia: General  ASA: III  Anesthesia Staff: Anesthesiologist: Anton Wade DO  C-AA: RONAK Rogel  Estimated Blood Loss: 10mL  Intra-op Medications: Administrations occurring from 0805 to 0935 on 24:  * No intraprocedure medications in log *           Anesthesia Record               Intraprocedure I/O Totals          Intake    LR infusion 300.00 mL    Total Intake 300 mL       Output    Est. Blood Loss 10 mL    Total Output 10 mL       Net    Net Volume 290 mL          Specimen: No specimens collected     Staff:   Circulator: Rocheni  Scrub Person: Kusum         Drains and/or Catheters:   Closed/Suction Drain 1 Anterior Neck Bulb 7 Fr. (Active)   Site Description Unable to view 24 1050   Dressing Status Clean;Dry 24 1050   Drainage Appearance Bright red 24 1050   Status To bulb suction 24 1050       Tourniquet Times:         Implants:  Implants       Type Name Action Serial No.       simplify DISC SIZE 2, HT 5, 5 DEGREE Implanted               Findings: Severe bilateral foraminal stenosis C5-6    Indications: Caron Hobson is an 59 y.o. female who is having surgery for Cervical radiculitis [M54.12].  Patient presented with intractable radicular pain, failed extensive nonsurgical treatment including physical therapy and  epidural injections.  MRI demonstrated stenosis at C5-6.  Preserved disc space height and minimal facet arthrosis acceptable for cervical disc arthroplasty versus fusion.    The patient was seen in the preoperative area. The risks, benefits, complications, treatment options, non-operative alternatives, expected recovery and outcomes were discussed with the patient. The possibilities of reaction to medication, pulmonary aspiration, injury to surrounding structures, bleeding, recurrent infection, the need for additional procedures, failure to diagnose a condition, and creating a complication requiring transfusion or operation were discussed with the patient. The patient concurred with the proposed plan, giving informed consent.  The site of surgery was properly noted/marked if necessary per policy. The patient has been actively warmed in preoperative area. Preoperative antibiotics have been ordered and given within 1 hours of incision. Venous thrombosis prophylaxis have been ordered including bilateral sequential compression devices    Procedure Details:   The patient was brought back to the operating room and general anesthesia was induced.  SCDs were placed on his lower extremities. [Leads for spinal cord monitoring using somatosensory evoked potentials were placed on the upper and lower extremities, baseline signals were obtained.]  Arms were padded and tucked to the side.  The neck was placed into slight extension.  The anterior cervical spine was then prepped and draped in the usual sterile fashion.  A time-out was performed and preoperative antibiotics were given.    A left sided transverse incision was made over the anterior cervical spine.  Standard dissection to the anterior cervical spine was carried out in a typical fashion.  The longus colli muscle was elevated bilaterally using a Bovie.  Localizing x-ray was obtained to verify operative levels.  The C5-6 disc space was then identified, Trimline retractors  were placed.  Fortuna pins were placed in the adjacent vertebral bodies and gentle distraction was applied.  Microscope was brought in.    Under microscopic visualization and an annulotomy was performed using a 15 blade.  A thorough diskectomy was performed using a series of angled curettes and pituitary rongeurs.  The posterior and uncovertebral osteophytes were taken down with a high-speed bur.  Posterior annulus and PLL were identified.  The PLL was split longitudinally in line with its fibers using an angled curette.  The PLL was then taken down bilaterally using a small Kerrison rongeur.  Thorough foraminotomies were performed bilaterally.  Under fluoroscopic guidance, trial sizers were placed.  A size 5 mm lordotic simplify disc replacement was selected.  The chisel was inserted under fluoroscopic guidance.  The implant was then impacted into place under fluoroscopic guidance.    The wound was then irrigated with saline.  Meticulous hemostasis was obtained.  The wound was closed in layers over a Stefano Real Drain drain.  Dermabond Prineo and dry sterile dressing were then applied.    Patient was then awakened without difficulty.  There were no complications during the case.  Patient was placed into a soft cervical collar and transferred to PACU in stable condition.      Complications:  None; patient tolerated the procedure well.    Disposition: PACU - hemodynamically stable.  Condition: stable         Additional Details:     Attending Attestation: I was present and scrubbed for the key portions of the procedure.    Escobar Coe  Phone Number: [phone]

## 2024-07-02 NOTE — DISCHARGE INSTR - OTHER ORDERS
Wound Care  Neck (Anterior Cervical Spine) Incision  -Change your dressing daily until there is no drainage from your incision site for 24-48 hours.         -The surgical site may be left open to air once drainage has stopped.   -Use a 4x4 and paper tape for dressing changes  -If there is a Prineo/Exofin dressing (strip) over your incision, do not remove it with your        dressing changes.        -The dressing will slowly lift away from the skin over time.   -No lotions, creams, or tub soaks.  -May use heat or ice to posterior neck and shoulders as needed for comfort. (It is common        to have discomfort between the shoulder blades and into the shoulders)

## 2024-07-02 NOTE — DISCHARGE INSTRUCTIONS
**Please call Swapna Garza RN (at [phone]) for any          postoperative questions or concerns.     Activity  Progressively walk 2 times a day.  Weight bearing as tolerated.  No pushing, pulling, or lifting objects greater than 10 pounds.  No excessive bending, twisting. No heavy house or yard work.  You may shower when there is no drainage from the incision site for 48 hours.  You may not return to school/work until your follow up appointment.  You may not drive until your follow up appointment or while taking narcotic medication.    Wound Type: Surgical Incision  -Once the incision  has been dry for 48 hours, you may leave the dressing off and the site open to air.  -Cover the wound with an abdominal pad and secure it with paper tape around the edges.  -If there is a Prineo/Exofin dressing (mesh strip) over your incision, do not remove it. The dressing will slowly lift away from the skin over time.   -No Lotions or Creams on or around the incision site.  -No tub soaks  -You may use heat or ice to your incision sites and or back as needed for comfort.    Call the office if:  You are breathing faster than normal.  Fever of 100.4 F or higher.  Chills  Urinating less than 4 times per day.  Acting very sleepy and difficult to awaken.  Vomiting and not able to eat or drink for 12 hours  3 or more loose, watery bowel movements in 24 hours (Diarrhea)  Concerns over the appearance of your incision.    Return immediately to the ER:  Persistent bilateral leg pain and or numbness >24 hours.  Perineal numbness/sensory loss  Urinary retention >12 hours  Loss of bowel/ bladder control

## 2024-07-02 NOTE — BRIEF OP NOTE
Date: 2024  OR Location: [facility] OR    Name: Caron Hobson : 1965, Age: 59 y.o., MRN: [id number], Sex: female    Diagnosis  Pre-op Diagnosis     * Cervical radiculitis [M54.12] Post-op Diagnosis     * Cervical radiculitis [M54.12]     Procedures  C5-6 anterior cervical decompression and disc replacement, possible fusion  08210 - [place] TOTAL DISC ARTHRP ANT SINGLE INTERSPACE CERVICAL      Surgeons      * Escobar Coe - Primary    Resident/Fellow/Other Assistant:  Surgeons and Role:     * Erik Pardo MD - Resident - Assisting    Procedure Summary  Anesthesia: Anesthesia type not filed in the log.  ASA: III  Anesthesia Staff: Anesthesiologist: Anton Wade DO  C-AA: RONAK Rogel  Estimated Blood Loss: 10mL  Intra-op Medications: Administrations occurring from 0805 to 0935 on 24:  * No intraprocedure medications in log *           Anesthesia Record               Intraprocedure I/O Totals          Output    Est. Blood Loss 10 mL    Total Output 10 mL          Specimen: No specimens collected     Staff:   Circulator: Mandeep  Scrub Person: Ksuum    Findings: Consistent with preop diagnosis    Complications:  None; patient tolerated the procedure well.     Disposition: PACU - hemodynamically stable.  Condition: stable  Specimens Collected: No specimens collected  Attending Attestation: I was present and scrubbed for the entire procedure.    Escobar Coe  Phone Number: [phone]

## 2024-07-02 NOTE — H&P
[facility] Department of Orthopaedic Surgery   Surgical History & Physical <30 Days  7/2/2024    Reason for Surgery: cervical radiculopathy  Planned Procedure: C5-6 anterior cervical decompression and disc arthroplasty    History & Physical Reviewed:  I have reviewed the History and Physical dated 6/12/2024. Relevant findings and updates are noted below:  No significant changes.    Home medications were reviewed with significant updates noted below:  No significant changes.    ERAS patient?: No    COVID-19 Risk Consent:   Surgeon has reviewed the key risks related to desirae COVID-19 and subsequent sequelae.     07/02/24 at 4:59 AM - Ladonna Fischer MD  Orthopaedic Surgery, PGY2

## 2024-07-02 NOTE — ANESTHESIA PROCEDURE NOTES
Peripheral IV  Date/Time: 7/2/2024 9:47 AM  Inserted by: RONAK Rogel    Placement  Needle size: 20 G  Laterality: left  Location: hand  Site prep: alcohol  Technique: anatomical landmarks  Attempts: 1

## 2024-07-02 NOTE — DISCHARGE INSTR - DIET
Resume your normal diet.    **You may need to start off eating softer foods and advance to more regular consistency foods as tolerated.

## 2024-07-03 VITALS
SYSTOLIC BLOOD PRESSURE: 123 MMHG | WEIGHT: 201.06 LBS | HEIGHT: 62 IN | TEMPERATURE: 96.3 F | HEART RATE: 89 BPM | BODY MASS INDEX: 37 KG/M2 | DIASTOLIC BLOOD PRESSURE: 79 MMHG | OXYGEN SATURATION: 95 % | RESPIRATION RATE: 18 BRPM

## 2024-07-03 LAB
ANION GAP SERPL CALC-SCNC: 14 MMOL/L (ref 10–20)
BUN SERPL-MCNC: 11 MG/DL (ref 6–23)
CALCIUM SERPL-MCNC: 8.9 MG/DL (ref 8.6–10.6)
CHLORIDE SERPL-SCNC: 105 MMOL/L (ref 98–107)
CO2 SERPL-SCNC: 23 MMOL/L (ref 21–32)
CREAT SERPL-MCNC: 0.85 MG/DL (ref 0.5–1.05)
EGFRCR SERPLBLD CKD-EPI 2021: 80 ML/MIN/1.73M*2
ERYTHROCYTE [DISTWIDTH] IN BLOOD BY AUTOMATED COUNT: 12.6 % (ref 11.5–14.5)
GLUCOSE SERPL-MCNC: 197 MG/DL (ref 74–99)
HCT VFR BLD AUTO: 38.6 % (ref 36–46)
HGB BLD-MCNC: 12.2 G/DL (ref 12–16)
MCH RBC QN AUTO: 27.3 PG (ref 26–34)
MCHC RBC AUTO-ENTMCNC: 31.6 G/DL (ref 32–36)
MCV RBC AUTO: 86 FL (ref 80–100)
NRBC BLD-RTO: 0 /100 WBCS (ref 0–0)
PLATELET # BLD AUTO: 241 X10*3/UL (ref 150–450)
POTASSIUM SERPL-SCNC: 3.9 MMOL/L (ref 3.5–5.3)
RBC # BLD AUTO: 4.47 X10*6/UL (ref 4–5.2)
SODIUM SERPL-SCNC: 138 MMOL/L (ref 136–145)
WBC # BLD AUTO: 10 X10*3/UL (ref 4.4–11.3)

## 2024-07-03 PROCEDURE — 80048 BASIC METABOLIC PNL TOTAL CA: CPT | Performed by: STUDENT IN AN ORGANIZED HEALTH CARE EDUCATION/TRAINING PROGRAM

## 2024-07-03 PROCEDURE — 2500000001 HC RX 250 WO HCPCS SELF ADMINISTERED DRUGS (ALT 637 FOR MEDICARE OP): Mod: SE | Performed by: STUDENT IN AN ORGANIZED HEALTH CARE EDUCATION/TRAINING PROGRAM

## 2024-07-03 PROCEDURE — 85027 COMPLETE CBC AUTOMATED: CPT | Performed by: STUDENT IN AN ORGANIZED HEALTH CARE EDUCATION/TRAINING PROGRAM

## 2024-07-03 PROCEDURE — 97530 THERAPEUTIC ACTIVITIES: CPT | Mod: GO

## 2024-07-03 PROCEDURE — 97161 PT EVAL LOW COMPLEX 20 MIN: CPT | Mod: GP

## 2024-07-03 PROCEDURE — 36415 COLL VENOUS BLD VENIPUNCTURE: CPT | Performed by: STUDENT IN AN ORGANIZED HEALTH CARE EDUCATION/TRAINING PROGRAM

## 2024-07-03 PROCEDURE — 2500000004 HC RX 250 GENERAL PHARMACY W/ HCPCS (ALT 636 FOR OP/ED): Mod: JZ,JG,SE | Performed by: STUDENT IN AN ORGANIZED HEALTH CARE EDUCATION/TRAINING PROGRAM

## 2024-07-03 PROCEDURE — 7100000011 HC EXTENDED STAY RECOVERY HOURLY - NURSING UNIT

## 2024-07-03 PROCEDURE — 97165 OT EVAL LOW COMPLEX 30 MIN: CPT | Mod: GO

## 2024-07-03 PROCEDURE — 97116 GAIT TRAINING THERAPY: CPT | Mod: GP

## 2024-07-03 RX ORDER — ACETAMINOPHEN 500 MG
1000 TABLET ORAL EVERY 8 HOURS
Qty: 180 TABLET | Refills: 0 | Status: SHIPPED | OUTPATIENT
Start: 2024-07-03 | End: 2024-08-02

## 2024-07-03 RX ORDER — METHOCARBAMOL 500 MG/1
1000 TABLET, FILM COATED ORAL EVERY 8 HOURS
Qty: 180 TABLET | Refills: 0 | Status: SHIPPED | OUTPATIENT
Start: 2024-07-03 | End: 2024-08-02

## 2024-07-03 RX ORDER — OXYCODONE HYDROCHLORIDE 5 MG/1
5 TABLET ORAL EVERY 4 HOURS PRN
Qty: 40 TABLET | Refills: 0 | Status: SHIPPED | OUTPATIENT
Start: 2024-07-03 | End: 2024-07-10

## 2024-07-03 RX ORDER — POLYETHYLENE GLYCOL 3350 17 G/17G
17 POWDER, FOR SOLUTION ORAL 2 TIMES DAILY
Qty: 60 PACKET | Refills: 0 | Status: SHIPPED | OUTPATIENT
Start: 2024-07-03 | End: 2024-08-02

## 2024-07-03 ASSESSMENT — PAIN SCALES - GENERAL
PAINLEVEL_OUTOF10: 0 - NO PAIN
PAINLEVEL_OUTOF10: 7
PAINLEVEL_OUTOF10: 7
PAINLEVEL_OUTOF10: 0 - NO PAIN
PAINLEVEL_OUTOF10: 8
PAINLEVEL_OUTOF10: 7
PAINLEVEL_OUTOF10: 5 - MODERATE PAIN

## 2024-07-03 ASSESSMENT — COGNITIVE AND FUNCTIONAL STATUS - GENERAL
MOBILITY SCORE: 18
HELP NEEDED FOR BATHING: A LITTLE
MOVING TO AND FROM BED TO CHAIR: A LITTLE
MOVING FROM LYING ON BACK TO SITTING ON SIDE OF FLAT BED WITH BEDRAILS: A LITTLE
CLIMB 3 TO 5 STEPS WITH RAILING: A LITTLE
TURNING FROM BACK TO SIDE WHILE IN FLAT BAD: A LITTLE
STANDING UP FROM CHAIR USING ARMS: A LITTLE
WALKING IN HOSPITAL ROOM: A LITTLE
DAILY ACTIVITIY SCORE: 23

## 2024-07-03 ASSESSMENT — PAIN - FUNCTIONAL ASSESSMENT
PAIN_FUNCTIONAL_ASSESSMENT: 0-10

## 2024-07-03 ASSESSMENT — ACTIVITIES OF DAILY LIVING (ADL)
ADL_ASSISTANCE: INDEPENDENT
ADL_ASSISTANCE: INDEPENDENT
BATHING_ASSISTANCE: STAND BY

## 2024-07-03 NOTE — DISCHARGE SUMMARY
Discharge Diagnosis  Cervical radiculitis    Issues Requiring Follow-Up  Postop care    Test Results Pending At Discharge  Pending Labs       No current pending labs.            Hospital Course  59 year-old F who presented with cervical radiculopathy. Patient is now s/p C5-6 disc arthroplasty on 7/2/2024 by Dr. Coe. On the day of surgery, patient was identified in the pre-operative holding area and agreeable to proceed with surgery. Written consent was obtained.  Please see operative note for further details of this procedure. The surgery went without complications. Patient received 24 hours of cait-operative antibiotics. Patient recovered in the PACU before transfer to a regular nursing floor. The patient was monitored as an inpatient postoperatively for drain output, therapy, and pain control. The drain was pulled when output was appropriately low. The patient had good pain control, was voiding, and was neurovascularly stable at the time of discharge. Patient will follow-up with Dr. Coe in 3-4 weeks for post-operative visit.    Prescription for oxycodone 5mg or percocet 5mg-325mg q4-6 hr #40 provided due to exceedingly painful nature of surgical procedure. Patients undergoing these operations typically use 1-2 pills q4-6 hours for the first 1-2 weeks. The use will be monitored postoperatively by Dr. Coe and his team, and weaned appropriately during the recovery period.      Pertinent Physical Exam At Time of Discharge  Physical Exam  Vitals reviewed.         Home Medications     Medication List      START taking these medications     acetaminophen 500 mg tablet; Commonly known as: Tylenol; Take 2 tablets   (1,000 mg) by mouth every 8 hours.   methocarbamol 500 mg tablet; Commonly known as: Robaxin; Take 2 tablets   (1,000 mg) by mouth every 8 hours.   oxyCODONE 5 mg immediate release tablet; Commonly known as: Roxicodone;   Take 1 tablet (5 mg) by mouth every 4 hours if needed for severe pain (7 -   10)  or moderate pain (4 - 6) for up to 7 days.   polyethylene glycol 17 gram packet; Commonly known as: Glycolax,   Miralax; Take 17 g by mouth 2 times a day.     CONTINUE taking these medications     Advair Diskus 250-50 mcg/dose diskus inhaler; Generic drug: fluticasone   propion-salmeteroL   albuterol 90 mcg/actuation inhaler   amitriptyline 50 mg tablet; Commonly known as: Elavil   atorvastatin 40 mg tablet; Commonly known as: Lipitor   chlorhexidine 0.12 % solution; Commonly known as: Peridex; Use 15 mL in   the mouth or throat see administration instructions for 2 doses. Use the   night before and morning of surgery.   cholecalciferol 50,000 unit capsule; Commonly known as: Vitamin D-3   diclofenac sodium 1 % gel; Commonly known as: Voltaren   ferrous sulfate (325 mg ferrous sulfate) tablet   gabapentin 300 mg capsule; Commonly known as: Neurontin; Take 1 capsule   (300 mg) by mouth 2 times a day.   lancets misc   lidocaine 5 % patch; Commonly known as: Lidoderm; Place 1 patch over 12   hours on the skin once daily. Remove & discard patch within 12 hours or as   directed by MD.   Ozempic 0.25 mg or 0.5 mg (2 mg/3 mL) pen injector; Generic drug:   semaglutide     STOP taking these medications     cyclobenzaprine 10 mg tablet; Commonly known as: Flexeril       Outpatient Follow-Up  Future Appointments   Date Time Provider Department Center   7/24/2024  9:30 AM Sharon Angel PA-C [id number] [facility]   9/11/2024  2:00 PM Steven Farmer MD PhD [id number] [facility]       Erik Pardo MD

## 2024-07-03 NOTE — CARE PLAN
Problem: Pain - Adult  Goal: Verbalizes/displays adequate comfort level or baseline comfort level  Outcome: Progressing     Problem: Safety - Adult  Goal: Free from fall injury  Outcome: Progressing     Problem: Discharge Planning  Goal: Discharge to home or other facility with appropriate resources  Outcome: Progressing     Problem: Chronic Conditions and Co-morbidities  Goal: Patient's chronic conditions and co-morbidity symptoms are monitored and maintained or improved  Outcome: Progressing     Problem: Skin  Goal: Decreased wound size/increased tissue granulation at next dressing change  Outcome: Progressing  Goal: Participates in plan/prevention/treatment measures  Outcome: Progressing  Goal: Prevent/manage excess moisture  Outcome: Progressing  Goal: Prevent/minimize sheer/friction injuries  Outcome: Progressing  Goal: Promote/optimize nutrition  Outcome: Progressing  Goal: Promote skin healing  Outcome: Progressing     Problem: Fall/Injury  Goal: Not fall by end of shift  Outcome: Progressing  Goal: Be free from injury by end of the shift  Outcome: Progressing  Goal: Verbalize understanding of personal risk factors for fall in the hospital  Outcome: Progressing  Goal: Verbalize understanding of risk factor reduction measures to prevent injury from fall in the home  Outcome: Progressing  Goal: Use assistive devices by end of the shift  Outcome: Progressing  Goal: Pace activities to prevent fatigue by end of the shift  Outcome: Progressing     Problem: Diabetes  Goal: Achieve decreasing blood glucose levels by end of shift  Outcome: Progressing  Goal: Increase stability of blood glucose readings by end of shift  Outcome: Progressing  Goal: Decrease in ketones present in urine by end of shift  Outcome: Progressing  Goal: Maintain electrolyte levels within acceptable range throughout shift  Outcome: Progressing  Goal: Maintain glucose levels >70mg/dl to <250mg/dl throughout shift  Outcome: Progressing  Goal: No  changes in neurological exam by end of shift  Outcome: Progressing  Goal: Learn about and adhere to nutrition recommendations by end of shift  Outcome: Progressing  Goal: Vital signs within normal range for age by end of shift  Outcome: Progressing  Goal: Increase self care and/or family involovement by end of shift  Outcome: Progressing  Goal: Receive DSME education by end of shift  Outcome: Progressing     Problem: Pain  Goal: Takes deep breaths with improved pain control throughout the shift  Outcome: Progressing  Goal: Turns in bed with improved pain control throughout the shift  Outcome: Progressing  Goal: Walks with improved pain control throughout the shift  Outcome: Progressing  Goal: Performs ADL's with improved pain control throughout shift  Outcome: Progressing  Goal: Participates in PT with improved pain control throughout the shift  Outcome: Progressing  Goal: Free from opioid side effects throughout the shift  Outcome: Progressing  Goal: Free from acute confusion related to pain meds throughout the shift  Outcome: Progressing   The patient's goals for the shift include      The clinical goals for the shift include pt will remain safe and free from harm throughout my shift

## 2024-07-03 NOTE — HOSPITAL COURSE
59 year-old F who presented with cervical radiculopathy. Patient is now s/p C5-6 disc arthroplasty on 7/2/2024 by Dr. Coe. On the day of surgery, patient was identified in the pre-operative holding area and agreeable to proceed with surgery. Written consent was obtained.  Please see operative note for further details of this procedure. The surgery went without complications. Patient received 24 hours of cait-operative antibiotics. Patient recovered in the PACU before transfer to a regular nursing floor. The patient was monitored as an inpatient postoperatively for drain output, therapy, and pain control. The drain was pulled when output was appropriately low. The patient had good pain control, was voiding, and was neurovascularly stable at the time of discharge. Patient will follow-up with Dr. Coe in 3-4 weeks for post-operative visit.    Prescription for oxycodone 5mg or percocet 5mg-325mg q4-6 hr #40 provided due to exceedingly painful nature of surgical procedure. Patients undergoing these operations typically use 1-2 pills q4-6 hours for the first 1-2 weeks. The use will be monitored postoperatively by Dr. Coe and his team, and weaned appropriately during the recovery period.

## 2024-07-03 NOTE — CARE PLAN
Problem: Pain - Adult  Goal: Verbalizes/displays adequate comfort level or baseline comfort level  Outcome: Met     Problem: Safety - Adult  Goal: Free from fall injury  Outcome: Met     Problem: Discharge Planning  Goal: Discharge to home or other facility with appropriate resources  Outcome: Met     Problem: Fall/Injury  Goal: Not fall by end of shift  Outcome: Met  Goal: Be free from injury by end of the shift  Outcome: Met  Goal: Verbalize understanding of personal risk factors for fall in the hospital  Outcome: Met  Goal: Verbalize understanding of risk factor reduction measures to prevent injury from fall in the home  Outcome: Met  Goal: Use assistive devices by end of the shift  Outcome: Met  Goal: Pace activities to prevent fatigue by end of the shift  Outcome: Met       The clinical goals for the shift include patient remains safe throughout shift

## 2024-07-03 NOTE — PROGRESS NOTES
I met with Caron at the bedside regarding discharge planning and home going needs. Patient lives home with her spouse [phone] where she is usually independent with ADL's without assistive devices. Patient is medically cleared for discharge home today pending therapy recommendations. I will continue to follow with a safe discharge plan.

## 2024-07-03 NOTE — PROGRESS NOTES
Physical Therapy    Physical Therapy Evaluation & Treatment    Patient Name: Caron Hobson  MRN: [id number]  Today's Date: 7/3/2024   Time Calculation  Start Time: 1016  Stop Time: 1040  Time Calculation (min): 24 min    Assessment/Plan   PT Assessment  PT Assessment Results: Decreased endurance, Impaired balance, Decreased mobility  Rehab Prognosis: Excellent  Barriers to Discharge: none  Evaluation/Treatment Tolerance: Patient tolerated treatment well  Medical Staff Made Aware: Yes  Strengths: Attitude of self  Barriers to Participation: Comorbidities  End of Session Communication: Bedside nurse  Assessment Comment: 59 year old female s/p C5-6 disc arthroplasty on 7/2/2024. Pt presents with decreased endurance and balance impacting functional mobility. Pt would benefit from continued PT while in hospital to improve functional mobility and return to PLOF.  End of Session Patient Position: Up in chair, Alarm on   IP OR SWING BED PT PLAN  Inpatient or Swing Bed: Inpatient  PT Plan  Treatment/Interventions: Bed mobility, Transfer training, Gait training, Stair training, Balance training, Neuromuscular re-education, Strengthening, Endurance training, Therapeutic exercise, Therapeutic activity, Home exercise program, Positioning, Postural re-education  PT Plan: Ongoing PT  PT Frequency: Daily  PT Discharge Recommendations: No PT needed after discharge  PT Recommended Transfer Status: Stand by assist  PT - OK to Discharge: Yes (PT eval complete and DC rec made)      Subjective     General Visit Information:  General  Reason for Referral: 59 year old female s/p  C5-6 disc arthroplasty on 7/2/2024  Past Medical History Relevant to Rehab: Arthritis, Cervical radiculopathy, Hyperlipidemia, Hypertension , Morbid (severe) obesity due to excess calories (Multi), Nephrolithiasis, Sleep apnea, Spinal stenosis, Type 2 diabetes mellitus (Multi)  Prior to Session Communication: Bedside nurse  Patient Position Received: Up in chair,  Alarm on  General Comment: Pt sitting in chair upon entry to room. Pt pleasant, cooperative and willing to work with PT. Pt with soft collar donned upon arrival to room.  Home Living:  Home Living  Type of Home: House  Lives With: Spouse, Adult children, Dependent children (hsb, 19 yo, 11 yo)  Home Adaptive Equipment: None  Home Layout: Multi-level, Laundry in basement, Bed/bath upstairs, Stairs to alternate level with rails  Alternate Level Stairs-Rails:  (1 HR)  Alternate Level Stairs-Number of Steps: 13+3 up to bed/bath, 7 down to laundry  Home Access: Stairs to enter without rails  Entrance Stairs-Number of Steps: 6  Bathroom Shower/Tub: Tub/shower unit  Bathroom Toilet: Standard  Bathroom Equipment: None  Prior Level of Function:  Prior Function Per Pt/Caregiver Report  Level of [place]: Independent with ADLs and functional transfers, Independent with homemaking with ambulation  ADL Assistance: Independent  Homemaking Assistance: Independent  Ambulatory Assistance: Independent  Prior Function Comments: pt denies any falls over the last 6 months  Precautions:  Precautions  Post-Surgical Precautions: Spinal precautions  Vital Signs:       Objective   Pain:  Pain Assessment  Pain Assessment: 0-10  0-10 (Numeric) Pain Score: 0 - No pain  Cognition:  Cognition  Overall Cognitive Status: Within Functional Limits  Orientation Level: Oriented X4    General Assessments:  Activity Tolerance  Endurance: Decreased tolerance for upright activites    Sensation  Light Touch: No apparent deficits    Static Sitting Balance  Static Sitting-Comment/Number of Minutes: sba  Dynamic Sitting Balance  Dynamic Sitting-Comments: sba    Static Standing Balance  Static Standing-Comment/Number of Minutes: sba  Dynamic Standing Balance  Dynamic Standing-Comments: sba  Functional Assessments:  Bed Mobility  Bed Mobility: No    Transfers  Transfer: Yes  Transfer 1  Technique 1: Sit to stand, Stand to sit  Transfer Device 1:  Walker  Transfer Level of Assistance 1: Close supervision  Trials/Comments 1: verbal cuing for hand placement    Ambulation/Gait Training  Ambulation/Gait Training Performed: Yes  Ambulation/Gait Training 1  Surface 1: Level tile  Device 1: Rolling walker  Assistance 1: Close supervision  Quality of Gait 1:  (decreased thomas and step length)  Comments/Distance (ft) 1: 150ft    Stairs  Stairs: Yes  Stairs  Rails 1: Right  Device 1: Railing  Assistance 1: Close supervision  Comment/Number of Steps 1: 4 x2 trials; using step over step gait    Extremity/Trunk Assessments:  RLE   RLE : Within Functional Limits  LLE   LLE : Within Functional Limits  Treatments:  Therapeutic Activity  Therapeutic Activity Performed: Yes  Therapeutic Activity 1: pt ambulating an additional 150ft with no AD and sba. Pt completing stair training, x2 trials with use of R rail x4 steps; and 1 trial without use of rail x4 steps; verbal cuing to maintain spinal precautions and for safety awareness.    Stairs  Stairs: Yes  Stairs  Rails 1: Right  Device 1: Railing  Assistance 1: Close supervision  Comment/Number of Steps 1: 4 x2 trials; using step over step gait  Stairs 2  Rails 2: None (Comment)  Device 2: No device  Assistance 2: Close supervision  Comment/Number of Steps 2: 4 stairs; step to gait pattern  Outcome Measures:  [facility] Basic Mobility  Turning from your back to your side while in a flat bed without using bedrails: A little  Moving from lying on your back to sitting on the side of a flat bed without using bedrails: A little  Moving to and from bed to chair (including a wheelchair): A little  Standing up from a chair using your arms (e.g. wheelchair or bedside chair): A little  To walk in hospital room: A little  Climbing 3-5 steps with railing: A little  Basic Mobility - Total Score: 18    Encounter Problems       Encounter Problems (Active)       Mobility       Pt will perform bed mobility independently.        Start:  07/03/24     Expected End:  07/17/24            Pt will ambulate >500ft independently with no assistive device with no LOB and VSS.         Start:  07/03/24    Expected End:  07/17/24            Pt will ascend and descend >1 flight Stairs with 1 Rails and sba Assist.         Start:  07/03/24    Expected End:  07/17/24               Pain - Adult              Education Documentation  Precautions, taught by Yandy Tian, PT at 7/3/2024 11:26 AM.  Learner: Patient  Readiness: Acceptance  Method: Explanation  Response: Verbalizes Understanding, Needs Reinforcement    Mobility Training, taught by Yandy Tian PT at 7/3/2024 11:26 AM.  Learner: Patient  Readiness: Acceptance  Method: Explanation  Response: Verbalizes Understanding, Needs Reinforcement    Education Comments  No comments found.    Yandy Tian, PT

## 2024-07-03 NOTE — PROGRESS NOTES
"Orthopaedic Surgery Progress Note    S:  Seen on the floor this AM. She is doing very well. Her R arm symptoms have significantly improved. She has some swallowing difficulty but no shortness of breath or feeling like she's suffocating. Pain well controlled. Overall she's doing very well.    O:  /71   Pulse 89   Temp 35.9 °C (96.6 °F) (Tympanic)   Resp 14   Ht 1.575 m (5' 2\")   Wt 91.2 kg (201 lb 1 oz)   SpO2 92%   BMI 36.77 kg/m²     Gen: arousable, NAD, appropriately conversational  Cardiac: RRR to peripheral palpation  Resp: nonlabored  GI: soft, nondistended    MSK:  Spine Exam:  Appropriate postoperative tenderness  Surgical dressing CDI w/o strikethru  RENEA Drain in place holding suction, serosanginous output  Soft collar in place    C5: SILT   Deltoid 5/5 Left; 5/5 Right  C6: SILT   Wrist Ext: 5/5 Left; 5/5 Right  C7: SILT   Triceps: 5/5 Left; 5/5 Right  C8: SILT   Finger flexion: 5/5 Left; 5/5 Right  T1: SILT    Interossei: 5/5 Left; 5/5 Right    Bicep Reflex 2+   Bilaterally  Wagner: Negative    L1: SILT       L2: SILT      Hip flexors 5/5 Left; 5/5 Right  L3: SILT      Knee extension 5/5 Left; 5/5 Right  L4: SILT      Tib Ant. (Dorsiflexion) 5/5 Left; 5/5 Right  L5: SILT      EHL 5/5 Left; 5/5 Right  S1: SILT      Plantarflexion 5/5 Left; 5/5 Right    A/P: 59 y.o. female now s/p C5-6 disc arthroplasty on 7/2/2024 with Dr. Coe.  Doing well postop.    Plan:  - Weight bearing:  WBAT, no head of bed restrictions but no heavy lifting, bending, or twisting  - DVT ppx: SCDs, hold dvt chemo ppx in setting of recent spine surgery  - Diet: Regular  - Pain: Tylenol, oxycodone 5/10, PRN dilaudid; PRN robaxin for muscle spasms; no toradol due to hx of gastric bypass  - Antibiotics: perioperative ancef 2g q8hr x3 doses  - FEN: HLIV with good PO intake  - Bowel Regimen: Colace, senna, dulcolax  - PT/OT  - Pulm: Encourage IS  - Continue home medications  - No ortiz  - Soft collar  - RENEA drain x1, dc'd on AM " FARA for a return call   rounds today.    Dispo: pending PT/OT, likely home today.     Erik Pardo MD  Orthopedic Surgery, PGY4  Available by Epic Message    While inpatient, this patient will be followed by the Orthopaedic Spine team. Please see contact information below:    Ladonna Fischer MD PGY2  Erik Pardo MD PGY4

## 2024-07-03 NOTE — PROGRESS NOTES
Occupational Therapy    Evaluation and Treatment    Patient Name: Caron Hobson  MRN: [id number]  Today's Date: 7/3/2024  Room: [address] [place] [address]  Time Calculation  Start Time: 0842  Stop Time: 0927  Time Calculation (min): 45 min    Assessment  IP OT Assessment  OT Assessment: Pt presents with decreased functional mobility and endurance that is below her baseline. Pt demonstrating ability to complete all functional tasks this day with SBA-IND. Pt has good family support and demonstrated good insight into her deficits and good understanding of her spinal precautions. Pt verbalized and demonstrated understanding of all education and training provided this day. Overall, patient likely to benefit from skilled OT services while admitted but will not require OT services after discharge.  Prognosis: Good  Barriers to Discharge: None  Evaluation/Treatment Tolerance: Patient limited by pain  Medical Staff Made Aware: Yes  End of Session Communication: Bedside nurse  End of Session Patient Position: Up in chair, Alarm on  Plan:  Inpatient Plan  Treatment Interventions: Functional transfer training, Endurance training, Equipment evaluation/education, Compensatory technique education  OT Frequency: 1 time per week  OT Discharge Recommendations: No OT needed after discharge  Equipment Recommended upon Discharge: Other (comment) (Reacher)  OT Recommended Transfer Status: Assist of 1  OT - OK to Discharge: Yes  OT Assessment  OT Assessment Results: Decreased safe judgment during ADL, Decreased endurance, Decreased functional mobility  Prognosis: Good  Barriers to Discharge: None  Evaluation/Treatment Tolerance: Patient limited by pain  Medical Staff Made Aware: Yes  Strengths: Ability to acquire knowledge, Capable of completing ADLs semi/independent, Attitude of self, Coping skills, Insight into problems, Support of Caregivers, Living arrangement secure  Barriers to Participation: Comorbidities    Subjective   Current Problem:  1.  Cervical radiculopathy  oxyCODONE (Roxicodone) 5 mg immediate release tablet    polyethylene glycol (Glycolax, Miralax) 17 gram packet    acetaminophen (Tylenol) 500 mg tablet    methocarbamol (Robaxin) 500 mg tablet      2. Cervical radiculitis          General:  Reason for Referral: s/p C5-6 disc arthroplasty on 7/2/2024 with Dr. Coe  Past Medical History Relevant to Rehab: Arthritis, Cervical radiculopathy, Hyperlipidemia, Hypertension , Morbid (severe) obesity due to excess calories (Multi), Nephrolithiasis, Sleep apnea, Spinal stenosis, Type 2 diabetes mellitus (Multi)  Prior to Session Communication: Bedside nurse  Patient Position Received: Bed, 3 rail up, Alarm on  Family/Caregiver Present: No  General Comment: Pt supine in bed upon arrival. Pleasant and agreeable to OT eval and tx. Pt expressing concerns with going home so soon.   Precautions:  Post-Surgical Precautions: Spinal precautions (C-spine)  Braces Applied: Soft collar on at arrival  Precautions Comment: Some cueing to maintain spinal precautions during functional task management but demonstrated understanding as session progressed  Vital Signs:  Heart Rate: 89  SpO2: 95 %  BP: 123/79  Pain:  Pain Assessment  Pain Assessment: 0-10  0-10 (Numeric) Pain Score: 7  Pain Type: Surgical pain, Acute pain  Pain Location: Neck  Pain Interventions: Medication (See MAR), Repositioned, Ambulation/increased activity, Relaxation technique, Rest  Response to Interventions: Pain increased at sessions end, pt resting comfortably in bed and received pain meds during session  Lines/Tubes/Drains:  Closed/Suction Drain 1 Anterior Neck Bulb 7 Fr. (Active)   Number of days: 1       External Urinary Catheter Female (Active)   Number of days: 0       Objective   Cognition:  Overall Cognitive Status: Within Functional Limits  Orientation Level: Oriented X4    Home Living:  Type of Home: House  Lives With: Spouse, Adult children, Dependent children (hsb, 21 yo, 12  yo)  Home Adaptive Equipment: None  Home Layout: Multi-level, Laundry in basement, Bed/bath upstairs, Stairs to alternate level with rails  Alternate Level Stairs-Rails:  (1)  Alternate Level Stairs-Number of Steps: 13+3 up to bed/bath, 7 down to laundry  Home Access: Stairs to enter without rails  Entrance Stairs-Rails: None  Entrance Stairs-Number of Steps: 6  Bathroom Shower/Tub: Tub/shower unit  Bathroom Toilet: Standard  Bathroom Equipment: None  Bathroom Accessibility: No issues accessing at baseline   Prior Function:  Level of [place]: Independent with ADLs and functional transfers, Independent with homemaking with ambulation  Receives Help From:  (hsb works during the day and children are away intermittently for work and summer camp but will have assistance intermittently once home)  ADL Assistance: Independent  Homemaking Assistance: Independent  Ambulatory Assistance: Independent  Hand Dominance: Right  IADL History:  Homemaking Responsibilities: Yes  Meal Prep Responsibility: Primary  Laundry Responsibility: Primary  Homemaking Comments: Pt is primary homemaker although many homemaking tasks are shared  Current License: Yes  Mode of Transportation: Car  Occupation: Unemployed  Leisure and Hobbies: TV, music  ADL:  Eating Assistance: Independent (Anticipated)  Grooming Assistance: Independent (Anticipated)  Bathing Assistance: Stand by (Anticipated)  Bathing Deficit: Supervision/safety, Increased time to complete , Verbal cueing  UE Dressing Assistance: Independent (Anticipated)  LE Dressing Assistance: Independent (Anticipated-demonstrated ability to doff and don bilateral socks while maintaining spinal precautions without VCs)  Toileting Assistance with Device: Independent (As demonstrated on toilet)  Activity Tolerance:  Endurance: Tolerates 10 - 20 min exercise with multiple rests  Balance:  Dynamic Sitting Balance  Dynamic Sitting-Comments: SBA  Dynamic Standing Balance  Dynamic  Standing-Comments: CGA-SBA  Static Sitting Balance  Static Sitting-Comment/Number of Minutes: IND  Static Standing Balance  Static Standing-Comment/Number of Minutes: SBA  Bed Mobility/Transfers: Bed Mobility/Transfers: Bed Mobility  Bed Mobility: Yes  Bed Mobility 1  Bed Mobility 1: Supine to sitting, Log roll  Level of Assistance 1: Close supervision, Moderate verbal cues  Bed Mobility Comments 1: VCs for log roll education and hand placement  Bed Mobility 2  Bed Mobility  2: Sitting to supine, Log roll  Level of Assistance 2: Close supervision, Moderate verbal cues  Bed Mobility Comments 2: VCs for log roll education  Functional Mobility  Functional Mobility Performed: Yes  Functional Mobility 1  Comments 1: Pt ambulated MIN household distance in room from bedside to bathroom and back using only the IV pole to stabilize and CGA; no LOB noted   and Transfers  Transfer: Yes  Transfer 1  Transfer From 1: Bed to  Transfer to 1: Stand  Technique 1: Sit to stand  Transfer Device 1:  (none)  Transfer Level of Assistance 1: Minimal verbal cues, Close supervision  Trials/Comments 1: VCs for sequencing and strategy  Transfers 2  Transfer From 2: Stand to, Toilet to  Transfer to 2: Toilet, Stand  Technique 2: Stand to sit, Sit to stand  Transfer Device 2:  (IV pole)  Transfer Level of Assistance 2: Close supervision, Minimal verbal cues  Trials/Comments 2: VCs for positioning  Transfers 3  Transfer From 3: Stand to, Bed to  Transfer to 3: Bed, Stand  Technique 3: Stand to sit, Sit to stand  Transfer Device 3:  (none)  Transfer Level of Assistance 3: Close supervision  Transfers 4  Transfer From 4: Stand to  Transfer to 4: Chair with arms  Technique 4: Stand to sit  Transfer Device 4:  (none)  Transfer Level of Assistance 4: Close supervision, Minimal verbal cues  Trials/Comments 4: VCs for positioning and safety  IADL's:   Homemaking Responsibilities: Yes  Meal Prep Responsibility: Primary  Laundry Responsibility:  Primary  Homemaking Comments: Pt is primary homemaker although many homemaking tasks are shared  Current License: Yes  Mode of Transportation: Car  Occupation: Unemployed  Leisure and Hobbies: TV, music  Vision: Vision - Basic Assessment  Current Vision: Wears glasses only for reading   and Vision - Complex Assessment  Ocular Range of Motion: Within Functional Limits  Sensation:  Light Touch: No apparent deficits  Strength:  Strength Comments: BUE WFL  Perception:  Inattention/Neglect: Appears intact  Initiation: Appears intact  Motor Planning: Appears intact  Perseveration: Not present  Coordination:  Movements are Fluid and Coordinated: Yes   Hand Function:  Hand Function  Gross Grasp: Functional  Coordination: Functional  Extremities:   RUE   RUE : Within Functional Limits, LUE   LUE: Within Functional Limits      Outcome Measures: [facility] Daily Activity  Putting on and taking off regular lower body clothing: None  Bathing (including washing, rinsing, drying): A little  Putting on and taking off regular upper body clothing: None  Toileting, which includes using toilet, bedpan or urinal: None  Taking care of personal grooming such as brushing teeth: None  Eating Meals: None  Daily Activity - Total Score: 23    OT Adult Other Outcome Measures  4AT: -    Education Documentation  Handouts, taught by Denver Davies OT at 7/3/2024 11:57 AM.  Learner: Patient  Readiness: Acceptance  Method: Explanation, Demonstration, Handout  Response: Verbalizes Understanding, Demonstrated Understanding  Comment: Pt provided handout and education on spinal precautions and tips, log roll technique, and also provided verbal and visual education on ADL/IADL management while maintaining spinal precautions- pt verbalized understanding of all education    ADL Training, taught by Denver Davies OT at 7/3/2024 11:57 AM.  Learner: Patient  Readiness: Acceptance  Method: Explanation, Demonstration, Handout  Response: Verbalizes  Understanding, Demonstrated Understanding  Comment: Pt provided handout and education on spinal precautions and tips, log roll technique, and also provided verbal and visual education on ADL/IADL management while maintaining spinal precautions- pt verbalized understanding of all education    Body Mechanics, taught by Denver Davies OT at 7/3/2024 11:57 AM.  Learner: Patient  Readiness: Acceptance  Method: Explanation, Demonstration  Response: Verbalizes Understanding, Demonstrated Understanding    Precautions, taught by Denver Davies OT at 7/3/2024 11:57 AM.  Learner: Patient  Readiness: Acceptance  Method: Explanation, Demonstration  Response: Verbalizes Understanding, Demonstrated Understanding    Education Comments  No comments found.      Goals:   Encounter Problems       Encounter Problems (Active)       ADLs       Pt will complete UB /LB bathing tasks with independent level of assistance while standing.        Start:  07/03/24    Expected End:  07/17/24               BALANCE       Pt will maintain dynamic standing balance during ADL task with independent level of assistance in order to demonstrate decreased risk of falling and improved postural control.       Start:  07/03/24    Expected End:  07/17/24               COGNITION/SAFETY       Patient will recall and adhere to C-spinal precautions during all functional mobility/ADL tasks in order to demonstrate improved understanding and promote healing post op       Start:  07/03/24    Expected End:  07/17/24               MOBILITY       Patient will perform Functional mobility max Household distances/Community Distances with independent level of assistance in order to improve safety and functional mobility.       Start:  07/03/24    Expected End:  07/17/24               TRANSFERS       Patient will perform log roll bed mobility with independent level of assistance in order to improve safety and independence with mobility       Start:  07/03/24     Expected End:  07/17/24                   Treatment Completed on Evaluation    Therapy/Activity:     Therapeutic Activity  Therapeutic Activity Performed: Yes    Therapeutic Activity 1: Increased time spent on education: spinal precautions, log roll technique, mobility techniques, compensatory strategies, energy conservation strategies    Therapeutic Activity 2: Educated on spinal precautions in relation to ADLs- bathing, dressing, toileting, eating, grooming. Education on spinal precautions in relation to IADLs- cooking, laundry, cleaning (verbal and visual demonstration)    Therapeutic Activity 3: Functional transfer and mobility training as noted requiring skilled supervision and cueing for safety and to educate on spinal precautions    07/03/24 at 12:01 PM   Denver Davies, OT   Rehab Office: [phone]

## 2024-07-08 ENCOUNTER — TELEPHONE (OUTPATIENT)
Dept: ORTHOPEDIC SURGERY | Facility: CLINIC | Age: 59
End: 2024-07-08
Payer: MEDICARE

## 2024-07-08 DIAGNOSIS — L24.A9 WOUND DRAINAGE: Primary | ICD-10-CM

## 2024-07-08 RX ORDER — SULFAMETHOXAZOLE AND TRIMETHOPRIM 800; 160 MG/1; MG/1
1 TABLET ORAL 2 TIMES DAILY
Qty: 10 TABLET | Refills: 0 | Status: SHIPPED | OUTPATIENT
Start: 2024-07-08 | End: 2024-07-13

## 2024-07-08 RX ORDER — POVIDONE-IODINE 10 %
SOLUTION, NON-ORAL TOPICAL
Qty: 1 KIT | Refills: 0 | Status: SHIPPED | OUTPATIENT
Start: 2024-07-08

## 2024-07-23 DIAGNOSIS — M54.12 CERVICAL RADICULITIS: ICD-10-CM

## 2024-07-24 ENCOUNTER — HOSPITAL ENCOUNTER (OUTPATIENT)
Dept: RADIOLOGY | Facility: CLINIC | Age: 59
Discharge: HOME | End: 2024-07-24
Payer: MEDICARE

## 2024-07-24 ENCOUNTER — OFFICE VISIT (OUTPATIENT)
Dept: ORTHOPEDIC SURGERY | Facility: CLINIC | Age: 59
End: 2024-07-24
Payer: MEDICARE

## 2024-07-24 DIAGNOSIS — M54.12 CERVICAL RADICULITIS: ICD-10-CM

## 2024-07-24 DIAGNOSIS — M54.12 CERVICAL RADICULOPATHY: Primary | ICD-10-CM

## 2024-07-24 PROCEDURE — 3044F HG A1C LEVEL LT 7.0%: CPT | Performed by: PHYSICIAN ASSISTANT

## 2024-07-24 PROCEDURE — 72040 X-RAY EXAM NECK SPINE 2-3 VW: CPT

## 2024-07-24 PROCEDURE — 99211 OFF/OP EST MAY X REQ PHY/QHP: CPT | Performed by: PHYSICIAN ASSISTANT

## 2024-07-24 PROCEDURE — 72040 X-RAY EXAM NECK SPINE 2-3 VW: CPT | Performed by: RADIOLOGY

## 2024-07-24 NOTE — PROGRESS NOTES
Caron is 3 weeks postop from a C5-6 disc arthroplasty performed Dr. Coe on 7/2.    She is gradually recovering from surgery.  She is having some posterior neck pain, mostly with bending forward.  She is also having some mild dysphagia, she feels like her food is getting stuck in her throat, no choking.  Her right arm radiculopathy has improved, but not completely gone.  She is ambulating without difficulty.  She is no longer taking oxycodone.  She has been wearing her soft collar.      On exam, normal gait without assistive devices.  Full strength of the upper extremities bilaterally.  Incision healing well, no evidence of infection.    I personally reviewed x-rays of the cervical spine completed today.  There is no evidence of acute fracture or implant failure.  Stable alignment of disc arthroplasty.    She can continue to increase her activities as tolerated, she should wean herself out of the soft collar.  She can increase her weight restriction 5 pounds per week.  She can drive now that she is no longer taking oxycodone.  She does not need refills medications today.  She will follow-up with me in 3 months with repeat x-rays, cervical AP and lateral only.    **This note was dictated using speech recognition software and was not corrected for spelling or grammatical errors**

## 2024-08-27 NOTE — PROGRESS NOTES
"Physical Therapy    Physical Therapy Treatment    Patient Name: Caron Hobson  MRN: [id number]  Today's Date: 2/27/2024  Treatment visit: 2  [place]  POC end date: 5/5/24  Time Calculation  Start Time: 0935  Stop Time: 1015  Time Calculation (min): 40 min  PT Therapeutic Procedures Time Entry  Therapeutic Exercise Time Entry: 40     Assessment:  Pt. Returned today following steroid injection in her right shoulder. The patient continues to endorse moderate to high level sof pain, however pt. Demonstrated improved shoulder mobility, less guarding and improved exercise tolerance today.  Pt. Progressed to completing wand shoulder ROM, with time the patient was able to move through a greater range of motion.  Pt. Also compelted standing scapular stabilization exercises without pain this date.       Plan: VENECIA shoulder flexion/ ABDmeghan       Current Problem  1. Cervical radiculopathy  Follow Up In Physical Therapy      2. Chronic right shoulder pain  Follow Up In Physical Therapy      3. Neck stiffness  Follow Up In Physical Therapy          General:  Pt. Reports that she got the injection in her shoulder.  It took a \"minute to kick in\" she has good days and bad days.  She can't lay on it.  She is able to move her arm up a little bit more.  Her pain continues to be about a 7/10.   Moving her arm a lot makes it worse.  She is no longer feeling the nerve pain traveling down her arm          Subjective    Precautions  Precautions  Precautions Comment: prior cervical fusion  Vital Signs: not taken     Pain  Pain Assessment  Pain Assessment: 0-10  Pain Score: 7  Pain Type: Chronic pain  Pain Location: Shoulder    Objective      Treatments:    There Ex:  Shoulder AROM flexion x20   Shoulder AROM wand ER x20  SL AAROM shoulder ABD x10  Shoulder shrugs supine x10 with cues   Shoulder retraction seated x10 with 3\" hold  Standing YTB rows 2x10 with 3\" hold, VC for technique  Standing YTB pulldowns 2x10 with 3\" hold, VC " for technique  Provided printed handout for HEP including shoulder shrugs and retractiosn in supine  Provided printed handout for walking program    OP EDUCATION:    Non-billable time HPE:  Supine Shrugs, shoulder retraction, wand flexion/ ER, standing YTB row/pulldowns       Goals:  Active       PT Problem       PT Goal 1       Start:  02/06/24    Expected End:  03/12/24       Pt. Will improve active R shoulder ROM by 10 degrees in 3 weeks to improve ability to reach and  objects in refrigerator.         PT Goal 2       Start:  02/06/24    Expected End:  03/12/24       Pt. Will improve Quick Dash by 8 points (final score) to indicate reduced self rating of disability and improved QOL.         PT Goal 3       Start:  02/06/24    Expected End:  03/12/24       Pt. Will be able to open jars with reduced pain in 5 weeks to improve quality of life.         PT Goal 4       Start:  02/06/24    Expected End:  05/05/24       Pt. Will improve R shoulder AROM by 20 degrees by end of POC to improve ability to reach to  objects in refrigerator.         PT Goal 5       Start:  02/06/24    Expected End:  05/05/24       Pt. Will improve Quick Dash by 16 points to meet MCID compared ot normative values.         LT - Misc 1       Start:  02/06/24    Expected End:  05/05/24       Pt. Will be able to carry laundry basket 20 ft. With reduced pain by end of POC to improve QOL and ability to perform this ADL and task that is part of her occupation.         [facility] - Misc 2       Start:  02/06/24    Expected End:  05/05/24       Pt. Will improve Patient specific questionnaire by 2 points to meet MDC.            Patient expressed no known problems or needs

## 2024-09-11 ENCOUNTER — OFFICE VISIT (OUTPATIENT)
Dept: SLEEP MEDICINE | Facility: CLINIC | Age: 59
End: 2024-09-11
Payer: MEDICARE

## 2024-09-11 VITALS
DIASTOLIC BLOOD PRESSURE: 79 MMHG | BODY MASS INDEX: 34.36 KG/M2 | SYSTOLIC BLOOD PRESSURE: 120 MMHG | WEIGHT: 182 LBS | RESPIRATION RATE: 18 BRPM | OXYGEN SATURATION: 99 % | HEIGHT: 61 IN | HEART RATE: 90 BPM

## 2024-09-11 DIAGNOSIS — G47.10 HYPERSOMNIA: Primary | ICD-10-CM

## 2024-09-11 DIAGNOSIS — Z51.81 ENCOUNTER FOR THERAPEUTIC DRUG LEVEL MONITORING: ICD-10-CM

## 2024-09-11 PROCEDURE — 3044F HG A1C LEVEL LT 7.0%: CPT | Performed by: INTERNAL MEDICINE

## 2024-09-11 PROCEDURE — 3008F BODY MASS INDEX DOCD: CPT | Performed by: INTERNAL MEDICINE

## 2024-09-11 PROCEDURE — 3078F DIAST BP <80 MM HG: CPT | Performed by: INTERNAL MEDICINE

## 2024-09-11 PROCEDURE — G2211 COMPLEX E/M VISIT ADD ON: HCPCS | Performed by: INTERNAL MEDICINE

## 2024-09-11 PROCEDURE — 3074F SYST BP LT 130 MM HG: CPT | Performed by: INTERNAL MEDICINE

## 2024-09-11 PROCEDURE — 99214 OFFICE O/P EST MOD 30 MIN: CPT | Performed by: INTERNAL MEDICINE

## 2024-09-11 PROCEDURE — 99204 OFFICE O/P NEW MOD 45 MIN: CPT | Performed by: INTERNAL MEDICINE

## 2024-09-11 PROCEDURE — 1036F TOBACCO NON-USER: CPT | Performed by: INTERNAL MEDICINE

## 2024-09-11 ASSESSMENT — PAIN SCALES - GENERAL: PAINLEVEL: 0-NO PAIN

## 2024-09-11 NOTE — PATIENT INSTRUCTIONS
[facility] Sleep Medicine  [facility]   [facility]  [address] [place] [address]    [facility] [place]  [address]  [facility] [id number] [phone]   [facility]  [address]    [facility] [address]           NAME: Caron Hobson   DATE: 9/11/2024     Your Sleep Provider Today: Steven Farmre MD PhD  Your Primary Care Physician: No Assigned PCP Generic MD Valery   Your Referring Provider: No ref. provider found    Thank you for coming to the Sleep Medicine Clinic today! Your sleep medicine provider today was: Steven Farmer MD PhD Below is a summary of your treatment plan, other important information, and our contact numbers:  If you need to schedule an appointment, please call [phone])  If you need general assistance (e.g. forms completed, general questions), please call my , Steffi, at [phone].  If you have a medical question about your sleep issues, please contact our nurses, Belgica or Sujatha at [phone].   You can also contact us through [employer].      DIAGNOSIS:   1. Hypersomnia  In-Center Sleep Study (Sleep Provider Only)    Drug Screen, Urine With Reflex to Confirmation    Multiple sleep latency test (Sleep Provider Only)      2. Encounter for therapeutic drug level monitoring  Drug Screen, Urine With Reflex to Confirmation              TREATMENT PLAN     You have hypersomnia and insomnia.  We are planning on  a sleep study and daytime sleep study to see why this might be        Follow-up Appointment:   Followup with me after testing      IMPORTANT INFORMATION     Call [phone] for medical emergencies.  Our offices are generally open from Monday-Friday, 9 am - 5 pm.  If you need to get in touch with me, you may either call me and my team(number is below) or you can use [employer].  If a referral for a test, for CPAP, or for another specialist was  made, and you have not heard about scheduling this within a week, please call scheduling at [phone] ([phone]).  If you are unable to make your appointment for clinic or an overnight study, kindly call the office at least 48 hours in advance to cancel and reschedule.  If you are on CPAP, please bring your device's card or the device to each clinic appointment.   There are no supporting services by either the sleep doctors or their staff on weekends and Holidays, or after 5 PM on weekdays.   If you have been asked to come to a sleep study, make sure you bring toiletries, a comfy pillow, and any nighttime medications that you may regularly take. Also be sure to eat dinner before you arrive. We generally do not provide meals.      PRESCRIPTIONS     We require 7 days advanced notice for prescription refills. If we do not receive the request in this time, we cannot guarantee that your medication will be refilled in time.      IMPORTANT PHONE NUMBERS      scheduling for medical testin340 - [phone]   Sleep Medicine Clinic Fax: [phone]  Appointments (for Pediatric Sleep Clinic): [phone] - option 1  Appointments (for Adult Sleep Clinic): [phone] - option 2  Appointments (For Sleep Studies): [phone] - option 3  Behavioral Sleep Medicine: [phone]  Bariatric Surgery: [phone] ( Bariatric Surgery Website)   Sleep Surgery: [phone]  ENT (Otolaryngology): [phone]  Myofunctional Therapy (ENT): AYLEEN Merida, Tesfaye Padron, Sang Montgomery; [phone]   Carlos Kate; [phone]  [place]; [phone]  [facility]; [phone]  Raudel Pastor/[facility] [phone] (option 1)  Headache Clinic (Neurology): [phone]  Neurology: [phone]  Psychiatry: [phone]  Pulmonary Function Testing (PFT) Center: [phone]  Pulmonary Medicine: [phone]  [employer] (DME): [phone]  [employer] (DME): [phone]  [facility] (DME): [phone]      COMMON PROVIDERS WE REFER TO     For Weight Loss - Dr. Natasha Childers - Call [phone]  For Sleep Surgery - Dr. Kameron Luna - Call [phone]      OUR ADULT SLEEP MEDICINE TEAM   Please do not hesitate to call the office or sleep nurse with any questions between appointments:    Adult Sleep Nurses (Sujatha Roberts, RN and Belgica Hernandez RN):  For clinical questions and refilling prescriptions: [phone]  Email sleep diaries and other documents at: [email or web address]    Adult Sleep Medicine Secretaries:  Mindy Blandon (For Tricia/Lyle/Krtiago/Courtney/Iain/Yaniv):   P: [phone]  F: [phone]  Steffi Chand (For Farmer/Chan): P: [phone]  Fax: [phone]  Gabby Armas (For Jurcevic/Blank): P: [phone]  F: [phone]  Danyelle Hamm (For John Paul): P: [phone]  F: [phone]  Lily Meng (For Jazzy/Valery/Zakhary): P: [phone]  F: [phone]  Arleen Butler (For Dillan/Alex): P: [phone]  F: [phone]     Adult Sleep Medicine Advanced Practice Providers:  Elmer Michaels ([place], [place])  Michelle Rasmussen ([facility])  Jory Giles CNP ([place], [place], [place])  Diane Laura CNP ([place], [place], [place])  Sharon Coker ([place], [place], [place])  Salty Johnson CNP ([facility])        OUR SLEEP TESTING LOCATIONS     Our team will contact you to schedule your sleep study, however, you can contact us as follow:  Main Phone Line (scheduling only): [phone]), option 3  Adult and Pediatric Locations  [facility] (6 years and older): [facility] - 4th floor ([address] [facility]) After hours line: [phone]  [facility] (Main campus: All ages): [facility], 6th floor. After hours line: [phone]  [facility] (5 years and older; younger considered on  "case-by-case basis): [address]; [address], [address]. Scheduling  After hours line: [phone]   [place] (6 years and older): [address]; [address]; [address]   [place] (6 years and older): [address] [facility], [address]  After hours line: [phone]   [place] (13 years and older) in [place]: [address], 2nd floor  After hours line: [phone]   [place] (13 year and older): [address]  After hours line: [phone]     Adult Only Locations:   [place] (18 years and older): [address] [place] [address], 2nd floor   [place] (18 years and older): [address] [facility]; 4th floor  After hours line: [phone]   [place] (18 years and older) at [place]: [address] [place] [address]  After hours line: [phone]          CONTACTING YOUR SLEEP MEDICINE PROVIDER     Send a message directly to your provider through \"My Chart\", which is the email service through your  Records Account: [email or web address]   Call [phone] and leave a message. One of the administrative assistants will forward the message to your sleep medicine provider through \"My Chart\" and/or email.     Your sleep medicine provider for this visit was: Steven Farmer MD PhD        "

## 2024-09-11 NOTE — PROGRESS NOTES
Patient: Caron Hobson    [id number]  : 1965 -- AGE 59 y.o.    Provider: Steven Farmer MD PhD     Location  [facility]   Service Date: 2024              [facility]  New Visit Note      HISTORY OF PRESENT ILLNESS     The patient's referring provider is: No ref. provider found    REASON FOR VISIT: No chief complaint on file.       HISTORY OF PRESENT ILLNESS   Ms. Caron Hobson is a 59 y.o. female with past medical history of ELIZABETH, cervical myelopathy status post C5-C6 disc arthroplasty, obesity, mild intermittent asthma, NAFLD, history of gastric bypass about 2019, T2DM, depressive disorder who presents to a [facility] for a sleep medicine evaluation with concerns of sleepiness.      PAST SLEEP HISTORY  She had a diagnostic sleep study on 2021 at a BMI of 35.  The total recording time was 532 minutes with a sleep time of 407 minutes and the sleep efficiency of 77%.  The overall AHI was 2.8 events/hour in the supine AHI was 3.2 events/hour.  The SpO2 natalie during sleep was 92%.  The overall RDI was reported 8.7 events/hour.    CURRENT HISTORY  On today's visit, the patient reports issues of sleepiness for several years and has been getting worse.. She gets sleepy during long rides in the car. She has fallen asleep talking to people during the day. If she is sitting sedentary she falls asleep unintentionally. 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    The patient also describes issues of difficulties staying asleep.  She often gets into bed by 9 or 10 PM as outlined below and can fall asleep quite quickly and awakens by 4 AM.  In the intervening time, however, she reports frequent fragmentation of her sleep and 5 or 6 times per night with variable time falling back asleep.  Her best estimates of her total sleep time are about 4 to 5 hours of sleep.    Sleep schedule:  In bed: 9-10PM  Activities in bed:   Bedtime  "Activities: watch TV - sometimes on all night  Subjective sleep latency:  < 5 mint  Awakenings during night: 5-6X  Length of awakenings: variable - TV is on  Final awakening time: 4AM  Out of bed: 4AM (alarm clock)  Overall estimate of total sleep time: 4-5h    Weekends: 6:30AM  Preferred sleeping position: supine and sidelying  Typically sleeps with her .       Associated sleep symptoms:  Breathing during sleep: no symptoms of snoring or concerns of breathing at night that she is aware of  Behaviors at night: No   Sleep paralysis: No   Hypnogogic or hypnopompic hallucinations: No   Cataplexy: No     Leg symptoms and timing:  - Sensations: Patient does not have unusual sensations in their extremities that cause an urge to move them       Sleep environment:  Pets in bed :  No  Bedroom temperature: WNL  Noise :   No\"   Issues with bed comfort :   No       Daytime Symptoms:  On awakening patient reports: feels sleepy    Daytime: During the day, she does doze unintentionally while inactive.  During more active tasks, she sometimes is drowsy.  With regards to daytime napping, the patient reports never taking naps. If she takes a nap, typically she awakens unrefreshed.  She does report that poor sleep results in mood-related irritability. She does report that poor sleep results in issues with memory/concentration.    Driving History: With driving, the patient admits to sleepy driving, with 0 sleepiness-related motor vehicle accidents and 0 near misses.      ESS: 22  CYNTHIA: 20  FOSQ:  14      REVIEW OF SYSTEMS     REVIEW OF SYSTEMS  Review of Systems   All other systems reviewed and are negative.      ALLERGIES AND MEDICATIONS     ALLERGIES  Allergies   Allergen Reactions    Coconut GI Upset and Hives    Lactose Diarrhea    Other Unknown       MEDICATIONS  Current Outpatient Medications   Medication Sig Dispense Refill    Advair Diskus 250-50 mcg/dose diskus inhaler       albuterol 90 mcg/actuation inhaler       " amitriptyline (Elavil) 50 mg tablet Take 1 tablet (50 mg) by mouth.      atorvastatin (Lipitor) 40 mg tablet Take 1 tablet (40 mg) by mouth.      chlorhexidine (Peridex) 0.12 % solution Use 15 mL in the mouth or throat see administration instructions for 2 doses. Use the night before and morning of surgery. 473 mL 0    cholecalciferol (Vitamin D-3) 50,000 unit capsule Take by mouth.      diclofenac sodium (Voltaren) 1 % gel APPLY 2 GRAMS TOPICALLY TO THE AFFECTED AREA FOUR TIMES DAILY      ferrous sulfate, 325 mg ferrous sulfate, tablet Take 1 tablet by mouth.      gabapentin (Neurontin) 300 mg capsule Take 1 capsule (300 mg) by mouth 2 times a day. 60 capsule 2    lancets misc 2 times a day.      lidocaine (Lidoderm) 5 % patch Place 1 patch over 12 hours on the skin once daily. Remove & discard patch within 12 hours or as directed by MD. 10 patch 0    methocarbamol (Robaxin) 500 mg tablet Take 2 tablets (1,000 mg) by mouth every 8 hours. 180 tablet 0    Ozempic 0.25 mg or 0.5 mg (2 mg/3 mL) pen injector INJECT 0.25MG UNDER THE SKIN ONCE WEEKLY      povidone-iodine (Betadine) 10 % topical solution Apply to incision twice daily and cover with dry gauze dressing for one week 1 kit 0     No current facility-administered medications for this visit.       PAST HISTORY     PAST MEDICAL HISTORY  She  has a past medical history of Arthritis, Awareness under anesthesia, Cervical radiculopathy, Hyperlipidemia, Hypertension, Morbid (severe) obesity due to excess calories (Multi), Nephrolithiasis, Sleep apnea, Spinal stenosis, and Type 2 diabetes mellitus (Multi).      PAST SURGICAL HISTORY:  Past Surgical History:   Procedure Laterality Date    APPENDECTOMY      CERVICAL FUSION  02/05/2021    C1-2 fusion    CHOLECYSTECTOMY      CT ANGIO NECK  01/31/2021    CT ANGIO NECK 1/31/2021    ESOPHAGOGASTRODUODENOSCOPY      GASTRIC BYPASS N/A     Gastric Surgery For Morbid Obesity Bypass With Yasmany-en-Y 01/24/2018    MR NECK ANGIO WO IV  "CONTRAST  02/01/2021    MR NECK ANGIO WO IV CONTRAST 2/1/2021 [facility]    OTHER SURGICAL HISTORY N/A 02/23/2021    Cervical vertebral fusion 02/23/2021    OTHER SURGICAL HISTORY  01/03/2018    Incision And Drainage Of Skin Abscess, Simple    TOTAL KNEE ARTHROPLASTY         FAMILY HISTORY  Family History   Problem Relation Name Age of Onset    Heart disease Father      Blood clot Daughter       She does have a family history of sleep disorder (e.g., sleep apnea, narcolepsy in any first degree relatives).      SOCIAL HISTORY  She  reports that she has never smoked. She has never used smokeless tobacco. She reports that she does not drink alcohol and does not use drugs. She currently lives with her . 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    Caffeine consumption: No  Alcohol consumption: No  Smoking: No  Marijuana: No      PHYSICAL EXAM     Physical Examination: /79   Pulse 90   Resp 18   Ht 1.549 m (5' 1\")   Wt 82.6 kg (182 lb)   SpO2 99%   BMI 34.39 kg/m²     PREVIOUS WEIGHTS:  Wt Readings from Last 3 Encounters:   09/11/24 82.6 kg (182 lb)   07/02/24 91.2 kg (201 lb 1 oz)   06/19/24 85.3 kg (188 lb 1.6 oz)       General: The patient is a pleasant female, in no acute distress. HEENT: She has a  a modified Mallampati grade 3 airway with Numbers; 0-4 (with +): No tonsils bilaterally. The soft palate was normal and the uvula was normal. The A/P diameter of the velopharynx was not narrowed. The oropharynx was not shallow in the A/P diameter. Lateral wall narrowing was not present. Tongue ridging was not present. Erythema of the posterior pharynx was not present. Mucosal hypertrophy in the posterior oropharynx was not present. Retrognathia was not and micrognathia was not present. Neck: The neck was not enlarged. No JVP, bruits or lymphadenopathy was appreciated. Chest: Clear to auscultation. No wheezes, rales, or rhonchi. Cardiovascular: Regular rate and rhythm. No murmurs, gallops, or clicks. " Abdomen: Soft, nontender, nondistended. Positive bowel sounds. Extremities: No clubbing, cyanosis, or edema is noted. Neurologic exam: Alert, oriented x3 and was grossly non-focal.    RESULTS/DATA     Iron (ug/dL)   Date Value   01/30/2019 60   04/18/2018 41     Iron Saturation (%)   Date Value   01/30/2019 18 (L)   04/18/2018 14 (L)     TIBC (ug/dL)   Date Value   01/30/2019 332   04/18/2018 296     Ferritin (ug/L)   Date Value   01/30/2019 222 (H)   04/18/2018 232 (H)       Bicarbonate (mmol/L)   Date Value   07/03/2024 23   06/19/2024 27   10/02/2021 23   02/06/2021 24   02/05/2021 25           DIAGNOSES     Problem List and Orders  Diagnoses and all orders for this visit:  Hypersomnia  -     In-Center Sleep Study (Sleep Provider Only); Future  -     Drug Screen, Urine With Reflex to Confirmation; Future  -     Multiple sleep latency test (Sleep Provider Only); Future  Encounter for therapeutic drug level monitoring  -     Drug Screen, Urine With Reflex to Confirmation; Future        ASSESSMENT/PLAN     Ms. Hobson is a 59 y.o. female and hx of ELIZABETH resolved with weight loss, cervical myelopathy status post C5-C6 disc arthroplasty, obesity, mild intermittent asthma, NAFLD, history of gastric bypass, T2DM, depressive disorder.  She presents to  the [facility] to address her hypersomnia.    Excessive daytime sleepiness: The differential diagnosis for excessive daytime sleepiness in this patient including several possibilities which include (a) sleep-related breathing disorders; (b) primary hypersomnia.  This hypersomnia is out of proportion to the degree of insomnia symptoms that she describes as well.    Given the possibility of a primary hypersomnia such as narcolepsy or idiopathic hypersomnolence, I would recommend an extended overnight sleep study followed by an MSLT to evaluate the severity of daytime sleepiness and to guide management.  If she has an RDI 4% greater than 15h then  the MSLT should not be performed.    Insomnia, NOS. patient reports issues of difficulties maintaining sleep which are of unclear cause.  We should perform a repeat sleep study to determine if she has a component of obstructive sleep apnea.  If the sleep study is negative for obstructive sleep apnea then an MSLT should be performed the next morning.    Follow up: After sleep study         Steven Farmer MD PhD

## 2024-10-17 DIAGNOSIS — M54.12 CERVICAL RADICULOPATHY: ICD-10-CM

## 2024-10-23 ENCOUNTER — APPOINTMENT (OUTPATIENT)
Dept: RADIOLOGY | Facility: CLINIC | Age: 59
End: 2024-10-23
Payer: MEDICARE

## 2024-11-04 ENCOUNTER — CLINICAL SUPPORT (OUTPATIENT)
Dept: SLEEP MEDICINE | Facility: CLINIC | Age: 59
End: 2024-11-04
Payer: MEDICARE

## 2024-11-04 DIAGNOSIS — G47.10 HYPERSOMNIA: ICD-10-CM

## 2024-11-05 ENCOUNTER — CLINICAL SUPPORT (OUTPATIENT)
Dept: SLEEP MEDICINE | Facility: CLINIC | Age: 59
End: 2024-11-05
Payer: MEDICARE

## 2024-11-05 VITALS
DIASTOLIC BLOOD PRESSURE: 79 MMHG | SYSTOLIC BLOOD PRESSURE: 120 MMHG | BODY MASS INDEX: 35.67 KG/M2 | HEIGHT: 61 IN | WEIGHT: 188.93 LBS

## 2024-11-05 DIAGNOSIS — Z51.81 ENCOUNTER FOR THERAPEUTIC DRUG LEVEL MONITORING: ICD-10-CM

## 2024-11-05 DIAGNOSIS — G47.10 HYPERSOMNIA: ICD-10-CM

## 2024-11-05 ASSESSMENT — SLEEP AND FATIGUE QUESTIONNAIRES
HOW LIKELY ARE YOU TO NOD OFF OR FALL ASLEEP WHILE WATCHING TV: HIGH CHANCE OF DOZING
HOW LIKELY ARE YOU TO NOD OFF OR FALL ASLEEP IN A CAR, WHILE STOPPED FOR A FEW MINUTES IN TRAFFIC: HIGH CHANCE OF DOZING
HOW LIKELY ARE YOU TO NOD OFF OR FALL ASLEEP WHILE SITTING QUIETLY AFTER LUNCH WITHOUT ALCOHOL: HIGH CHANCE OF DOZING
SITING INACTIVE IN A PUBLIC PLACE LIKE A CLASS ROOM OR A MOVIE THEATER: HIGH CHANCE OF DOZING
HOW LIKELY ARE YOU TO NOD OFF OR FALL ASLEEP WHILE LYING DOWN TO REST IN THE AFTERNOON WHEN CIRCUMSTANCES PERMIT: HIGH CHANCE OF DOZING
HOW LIKELY ARE YOU TO NOD OFF OR FALL ASLEEP WHILE SITTING AND READING: HIGH CHANCE OF DOZING
HOW LIKELY ARE YOU TO NOD OFF OR FALL ASLEEP WHILE SITTING AND TALKING TO SOMEONE: MODERATE CHANCE OF DOZING
ESS-CHAD TOTAL SCORE: 23
HOW LIKELY ARE YOU TO NOD OFF OR FALL ASLEEP WHEN YOU ARE A PASSENGER IN A CAR FOR AN HOUR WITHOUT A BREAK: HIGH CHANCE OF DOZING

## 2024-11-05 NOTE — PROGRESS NOTES
[facility] TECH NOTE:     Patient: Caron Hobson   MRN//AGE: [id number]  1965  59 y.o.   Technologist: Mary Varghese   Room: 4   Service Date: 2024        Sleep Testing Location: [facility]:     TECHNOLOGIST SLEEP STUDY PROCEDURE NOTE:   This sleep study is being conducted according to the policies and procedures outlined by the AAS accreditation standards.  The sleep study procedure and processes involved during this appointment was explained to the patient/patient’s family, questions were answered. The patient/family verbalized understanding.      The patient is a 59 y.o. year old female scheduled for a diagnostic PSG  with montage of:  standard . she arrived for her appointment.      The study that was ultimately completed was a diagnostic PSG  with montage of:  standard .    The full study Was completed.  Patient questionnaires completed?: yes     Consents signed? yes    Initial Fall Risk Screening:     Caron has not fallen in the last 6 months. her did not result in injury. Caron does not have a fear of falling. He does not need assistance with sitting, standing, or walking. she does not need assistance walking in her home. she does not need assistance in an unfamiliar setting. The patient is notusing an assistive device.     Brief Study observations: This patient presents as a PSG/MSLT. She arrived at 8:00 pm by herself. The sleep study procedure was explained to the patient and questions were answered. She verbalized understanding. She was not able to complete evening Bio-cals due to falling asleep. She will be staying for the MSLT portion of the sleep study. REM supine sleep was captured.      Deviation to order/protocol and reason: N/A      If PAP, which was preferred mask/pressure/mode: N/A      Other:None    After the procedure, the patient/family was informed to ensure followup with ordering clinician for testing results.      Technologist: Mary Varghese

## 2024-11-05 NOTE — PROGRESS NOTES
[facility] TECH NOTE:     Patient: Caron Hobson   MRN//AGE: [id number]  1965  59 y.o.   Technologist: ROSA TAYLOR   Room: 4   Service Date: 2024        [facility]    TECHNOLOGIST SLEEP STUDY PROCEDURE NOTE:   This sleep study is being conducted according to the policies and procedures outlined by the AAS accreditation standards.  The sleep study procedure and processes involved during this appointment was explained to the patient/patient’s family, questions were answered. The patient/family verbalized understanding.      The patient is a 59 y.o. year old female scheduled for aMSLT/MWT with montage of:  Standard MSLT . she arrived for her appointment.      The study that was ultimately completed was aMSLT/MWT with montage of:  Standard MSLT .    The full study Was completed.  Patient questionnaires completed?: yes     Consents signed? yes    Initial Fall Risk Screening:     Caron has not fallen in the last 6 months. her did not result in injury. Caron does not have a fear of falling. He does not need assistance with sitting, standing, or walking. she does not need assistance walking in her home. she does not need assistance in an unfamiliar setting. The patient is notusing an assistive device.     Brief Study observations: This patient presents for a MSLT. Explained procedure and answered questions. A diagnostic PSG was conducted the night before per protocol. All 5 naps were conducted per protocol. She was able to stay awake in between naps. She tolerated the procedure well overall. She was alert when discharged.      Deviation to order/protocol and reason: N/A      If PAP, which was preferred mask/pressure/mode: N/A      Other:None    After the procedure, the patient/family was informed to ensure followup with ordering clinician for testing results.      Technologist: ROSA TAYLOR

## 2024-12-02 NOTE — PROGRESS NOTES
Patient: Caron Hobson    [id number]  : 1965 -- AGE 59 y.o.    Provider: Steven Farmer MD PhD     Location [facility]   Service Date: 2024              [facility]  Followup Visit Note      HISTORY OF PRESENT ILLNESS     The patient's referring provider is: No ref. provider found    REASON FOR VISIT: No chief complaint on file.       HISTORY OF PRESENT ILLNESS   Ms. Caron Hobson is a 59 y.o. female with past medical history of ELIZABETH, cervical myelopathy status post C5-C6 disc arthroplasty, obesity, mild intermittent asthma, NAFLD, history of gastric bypass about , T2DM, depressive disorder who returns in follow-up to a [facility] for concerns of sleepiness.    PAST SLEEP HISTORY  She had a diagnostic sleep study on 2021 at a BMI of 35.  The total recording time was 532 minutes with a sleep time of 407 minutes and the sleep efficiency of 77%.  The overall AHI was 2.8 events/hour in the supine AHI was 3.2 events/hour.  The SpO2 natalie during sleep was 92%.  The overall RDI was reported 8.7 events/hour.    Patient seen on 24 for issues of worsening sleepiness. She has had issues of sleepiness with driving and even talking with people during the day.  She reports intermittent sleepiness with sedentary activities.  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  She also reported issues of staying asleep with going into bed by 9 to 10 PM, being able to fall asleep quickly but awakening by 4 AM.  She estimated her total sleep time at about 4 or 5 hours per night.    Patient was recommended to have a overnight sleep study followed by an MSLT.  The diagnostic PSG was performed on 2024 at a BMI of 35.7 with an ESS of 23.  Her total sleep time was 414 minutes and the sleep efficiency was 90%.  The periodic limb movements were noted at 0.  The RDI 3% was 2.3 events/hour the RDI 4%  was 0.4 events/hour.  The T88 was 0 minutes and the SpO2 natalie was 93%.  An MSLT was performed the next day on 11/5/2024.  A urine drug screen was not performed on this night.  Sleep diaries demonstrated irregular sleep onset latency with sleep durations of 3 to 6 hours nightly.  For the last 2 nights the patient reports not sleeping at all.  The patient's mean sleep latency was 6.7 minutes.  The distributions of her sleep latency on each of the 5 naps were as follows at 0 minutes, 2.5 minutes, 10 minutes, 20 minutes, and 1 minute.  N2 sleep latency was reported as follows as 0 minutes, 1 minute, 0.5 minutes, not present, 0.5 minutes.  May have been taking amitriptyline.    CURRENT HISTORY  At the time of her last visit we had recommended a overnight sleep study followed by an MSLT.  Currently this is scheduled to occur on 11/4 and 11/5..    On today's visit, pt reports excessive sleepiness and fatigue. She can fall asleep in bed immediately when she goes to bed because she is tired around 730-830pm. She would then have multiple awakenings and wake up at 2am, she would then wake up every hour until 4am. And get out of bed and start her day. She would feel sleepy as soon as he starts her day, she can fall asleep in a car, at work, at other peoples' houses, watching TV.     Sleep schedule:  In bed: 9-10PM  Activities in bed:   Bedtime Activities: watch TV - sometimes on all night  Subjective sleep latency:  < 5 mint  Awakenings during night: 5-6X  Length of awakenings: variable - TV is on  Final awakening time: 4AM  Out of bed: 4AM (alarm clock)  Overall estimate of total sleep time: 4-5h    Weekends: 6:30AM  Preferred sleeping position: supine and sidelying  Typically sleeps with her .       Daytime Symptoms:  On awakening patient reports: feels sleepy    Daytime: During the day, she does doze unintentionally while inactive.  During more active tasks, she sometimes is drowsy.  With regards to daytime napping,  the patient reports never taking naps. If she takes a nap, typically she awakens unrefreshed.  She does report that poor sleep results in mood-related irritability. She does report that poor sleep results in issues with memory/concentration.    Driving History: With driving, the patient admits to sleepy driving, with 0 sleepiness-related motor vehicle accidents and 0 near misses.      ESS: 22  CYNTHIA: 20  FOSQ:  14      REVIEW OF SYSTEMS     REVIEW OF SYSTEMS  Review of Systems   All other systems reviewed and are negative.      ALLERGIES AND MEDICATIONS     ALLERGIES  Allergies   Allergen Reactions    Coconut GI Upset and Hives    Lactose Diarrhea    Other Unknown       MEDICATIONS  Current Outpatient Medications   Medication Sig Dispense Refill    Advair Diskus 250-50 mcg/dose diskus inhaler       albuterol 90 mcg/actuation inhaler       amitriptyline (Elavil) 50 mg tablet Take 1 tablet (50 mg) by mouth.      atorvastatin (Lipitor) 40 mg tablet Take 1 tablet (40 mg) by mouth.      chlorhexidine (Peridex) 0.12 % solution Use 15 mL in the mouth or throat see administration instructions for 2 doses. Use the night before and morning of surgery. 473 mL 0    cholecalciferol (Vitamin D-3) 50,000 unit capsule Take by mouth.      diclofenac sodium (Voltaren) 1 % gel APPLY 2 GRAMS TOPICALLY TO THE AFFECTED AREA FOUR TIMES DAILY      ferrous sulfate, 325 mg ferrous sulfate, tablet Take 1 tablet by mouth.      gabapentin (Neurontin) 300 mg capsule Take 1 capsule (300 mg) by mouth 2 times a day. 60 capsule 2    lancets misc 2 times a day.      lidocaine (Lidoderm) 5 % patch Place 1 patch over 12 hours on the skin once daily. Remove & discard patch within 12 hours or as directed by MD. 10 patch 0    methocarbamol (Robaxin) 500 mg tablet Take 2 tablets (1,000 mg) by mouth every 8 hours. 180 tablet 0    Ozempic 0.25 mg or 0.5 mg (2 mg/3 mL) pen injector INJECT 0.25MG UNDER THE SKIN ONCE WEEKLY      povidone-iodine (Betadine) 10 %  topical solution Apply to incision twice daily and cover with dry gauze dressing for one week 1 kit 0     No current facility-administered medications for this visit.       PAST HISTORY     PAST MEDICAL HISTORY  She  has a past medical history of Arthritis, Awareness under anesthesia, Cervical radiculopathy, Hyperlipidemia, Hypertension, Morbid (severe) obesity due to excess calories (Multi), Nephrolithiasis, Sleep apnea, Spinal stenosis, and Type 2 diabetes mellitus (Multi).      PAST SURGICAL HISTORY:  Past Surgical History:   Procedure Laterality Date    APPENDECTOMY      CERVICAL FUSION  02/05/2021    C1-2 fusion    CHOLECYSTECTOMY      CT ANGIO NECK  01/31/2021    CT ANGIO NECK 1/31/2021    ESOPHAGOGASTRODUODENOSCOPY      GASTRIC BYPASS N/A     Gastric Surgery For Morbid Obesity Bypass With Yasmany-en-Y 01/24/2018    MR NECK ANGIO WO IV CONTRAST  02/01/2021    MR NECK ANGIO WO IV CONTRAST 2/1/2021 [facility] CLINICAL LEGACY    OTHER SURGICAL HISTORY N/A 02/23/2021    Cervical vertebral fusion 02/23/2021    OTHER SURGICAL HISTORY  01/03/2018    Incision And Drainage Of Skin Abscess, Simple    TOTAL KNEE ARTHROPLASTY         FAMILY HISTORY  Family History   Problem Relation Name Age of Onset    Heart disease Father      Blood clot Daughter       She does have a family history of sleep disorder (e.g., sleep apnea, narcolepsy in any first degree relatives).      SOCIAL HISTORY  She  reports that she has never smoked. She has never used smokeless tobacco. She reports that she does not drink alcohol and does not use drugs. She currently lives with her . 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    Caffeine consumption: No  Alcohol consumption: No  Smoking: No  Marijuana: No      PHYSICAL EXAM     Physical Examination: There were no vitals taken for this visit.    PREVIOUS WEIGHTS:  Wt Readings from Last 3 Encounters:   11/05/24 85.7 kg (188 lb 15 oz)   09/11/24 82.6 kg (182 lb)   07/02/24 91.2 kg (201 lb 1 oz)       General:  The patient is a pleasant female, in no acute distress. HEENT: She has a  a modified Mallampati grade 3 airway with Numbers; 0-4 (with +): No tonsils bilaterally. The soft palate was normal and the uvula was normal. The A/P diameter of the velopharynx was not narrowed. The oropharynx was not shallow in the A/P diameter. Lateral wall narrowing was not present. Tongue ridging was not present. Erythema of the posterior pharynx was not present. Mucosal hypertrophy in the posterior oropharynx was not present. Retrognathia was not and micrognathia was not present. Neck: The neck was not enlarged. No JVP, bruits or lymphadenopathy was appreciated. Chest: Clear to auscultation. No wheezes, rales, or rhonchi. Cardiovascular: Regular rate and rhythm. No murmurs, gallops, or clicks. Abdomen: Soft, nontender, nondistended. Positive bowel sounds. Extremities: No clubbing, cyanosis, or edema is noted. Neurologic exam: Alert, oriented x3 and was grossly non-focal.    RESULTS/DATA     Iron (ug/dL)   Date Value   01/30/2019 60   04/18/2018 41     Iron Saturation (%)   Date Value   01/30/2019 18 (L)   04/18/2018 14 (L)     TIBC (ug/dL)   Date Value   01/30/2019 332   04/18/2018 296     Ferritin (ug/L)   Date Value   01/30/2019 222 (H)   04/18/2018 232 (H)       Bicarbonate (mmol/L)   Date Value   07/03/2024 23   06/19/2024 27   10/02/2021 23   02/06/2021 24   02/05/2021 25           DIAGNOSES     Problem List and Orders  There are no diagnoses linked to this encounter.        ASSESSMENT/PLAN     Ms. Hobson is a 59 y.o. female and hx of ELIZABETH resolved with weight loss, cervical myelopathy status post C5-C6 disc arthroplasty, obesity, mild intermittent asthma, NAFLD, history of gastric bypass, T2DM, depressive disorder.  She presents to  the [facility] to address her hypersomnia.    Excessive daytime sleepiness: The differential diagnosis for excessive daytime sleepiness in this patient including several  possibilities which include (a) sleep-related breathing disorders; (b) primary hypersomnia.  This hypersomnia is out of proportion to the degree of insomnia symptoms that she describes as well.    Given the possibility of a primary hypersomnia such as narcolepsy or idiopathic hypersomnolence, I would recommend an extended overnight sleep study followed by an MSLT to evaluate the severity of daytime sleepiness and to guide management.  If she has an RDI 4% greater than 15h then the MSLT should not be performed.    Insomnia, NOS. patient reports issues of difficulties maintaining sleep which are of unclear cause.  We should perform a repeat sleep study to determine if she has a component of obstructive sleep apnea.  If the sleep study is negative for obstructive sleep apnea then an MSLT should be performed the next morning.    Follow up: After sleep study    -start a trial of armodafinil 150mg in the morning  - signed a CSA today  - work on consolidating your sleep by going to bed at 10pm and waking up        Mary Lewis MD  Sleep Fellow    Case discussed with Dr. Farmer      ----------------------------------------------------------------------------------------------------------------------  12/4/2024  9:42 AM ::     Written by Steven Farmer MD, PhD    Briefly, Ms. Hobson, a 59 y.o. female, was evaluated at the [facility] for her sleepiness and insomnia.     I personally saw and evaluated the patient. I discussed the patient with the Fellow and agree with their note which accurately reflects my own findings and plan. In summary, she continues to have sleepiness and insomnia.  Her sleep study results so that she falls asleep almost instantaneously and she had more than 90% sleep efficiency.  Her MSLT testing demonstrated that she has moderate sleepiness that improves with a successive nap, however, she does fall into deeper stage II sleep rather quickly.  This indicates that her  sleepiness seem somewhat out of proportion to her insomnia.    When asked if she feels that her insomnia is more problematic than her sleepiness, the patient feels that her sleepiness is more of an issue.  Given this we discussed starting her on a stimulant to keep her safe during the day and more awake so that she avoids napping during the day and hopefully this will help to consolidate her sleep at night.  We did discuss that she needs to implement and impose a strict sleep schedule and avoiding naps during the day to improve her overall sleep hygiene and improve her insomnia symptoms.    In addition, her depression may play a role in her motivation and excessive time in bed at times.  He is seeing a mental health provider but has been reticent about starting medication for her depression.  We discussed that some medications that are used for depression can also help with her sleep and it may be more helpful for us to consider medication that has 2 actions.    Please refer to the full clinic note for specific details.    Steven Farmer MD, PhD  Division of Pulmonary, Critical Care, and Sleep Medicine  Clinical Associate Professor of Medicine, [facility]  , Sleep Medicine  System Director,  Sleep Medicine    ----------------------------------------------------------------------------------------------------------------------    Steven Farmer MD PhD

## 2024-12-03 ENCOUNTER — OFFICE VISIT (OUTPATIENT)
Dept: SLEEP MEDICINE | Facility: HOSPITAL | Age: 59
End: 2024-12-03
Payer: MEDICARE

## 2024-12-03 VITALS — BODY MASS INDEX: 34.78 KG/M2 | WEIGHT: 184 LBS

## 2024-12-03 DIAGNOSIS — G47.19 EXCESSIVE DAYTIME SLEEPINESS: Primary | ICD-10-CM

## 2024-12-03 DIAGNOSIS — F51.04 CHRONIC INSOMNIA: ICD-10-CM

## 2024-12-03 DIAGNOSIS — Z79.899 MEDICATION MANAGEMENT: ICD-10-CM

## 2024-12-03 DIAGNOSIS — G47.10 HYPERSOMNIA: ICD-10-CM

## 2024-12-03 PROCEDURE — 99214 OFFICE O/P EST MOD 30 MIN: CPT | Performed by: INTERNAL MEDICINE

## 2024-12-03 PROCEDURE — 3044F HG A1C LEVEL LT 7.0%: CPT | Performed by: INTERNAL MEDICINE

## 2024-12-03 PROCEDURE — G2211 COMPLEX E/M VISIT ADD ON: HCPCS | Performed by: INTERNAL MEDICINE

## 2024-12-03 RX ORDER — ARMODAFINIL 150 MG/1
150 TABLET ORAL DAILY
Qty: 30 TABLET | Refills: 0 | Status: SHIPPED | OUTPATIENT
Start: 2024-12-03 | End: 2025-01-02

## 2024-12-03 ASSESSMENT — PAIN SCALES - GENERAL: PAINLEVEL_OUTOF10: 9

## 2024-12-03 NOTE — PATIENT INSTRUCTIONS
Start armodafinil in the morning. Be mindful of the side effects we discussed. Go to bed at 10pm, wake up at 4-5am. We will refer you to a sleep psychologist for CBT-I to help you re-establish healthy sleep patterns. Follow up in 2-3 months.

## 2024-12-04 ENCOUNTER — APPOINTMENT (OUTPATIENT)
Dept: SLEEP MEDICINE | Facility: CLINIC | Age: 59
End: 2024-12-04
Payer: MEDICARE

## 2024-12-04 NOTE — PROGRESS NOTES
OARRS:  No data recorded  I have personally reviewed the OARRS report for Caron Hobson. I have considered the risks of abuse, dependence, addiction and diversion    Is the patient prescribed a combination of a benzodiazepine and opioid?  No    Last Urine Drug Screen / ordered today: Yes  No results found for this or any previous visit (from the past 8760 hours).  N/A        Controlled Substance Agreement:  Date of the Last Agreement:  12/3/24  Reviewed Controlled Substance Agreement including but not limited to the benefits, risks, and alternatives to treatment with a Controlled Substance medication(s).    Stimulants:   What is the patient's goal of therapy? Stay awake  Is this being achieved with current treatment? Just started

## 2025-03-18 ENCOUNTER — APPOINTMENT (OUTPATIENT)
Dept: RADIOLOGY | Facility: HOSPITAL | Age: 60
End: 2025-03-18
Payer: COMMERCIAL

## 2025-03-18 ENCOUNTER — APPOINTMENT (OUTPATIENT)
Dept: SLEEP MEDICINE | Facility: HOSPITAL | Age: 60
End: 2025-03-18
Payer: MEDICARE

## 2025-03-18 ENCOUNTER — HOSPITAL ENCOUNTER (EMERGENCY)
Facility: HOSPITAL | Age: 60
Discharge: HOME | End: 2025-03-18
Payer: COMMERCIAL

## 2025-03-18 VITALS
DIASTOLIC BLOOD PRESSURE: 84 MMHG | HEART RATE: 77 BPM | OXYGEN SATURATION: 98 % | SYSTOLIC BLOOD PRESSURE: 123 MMHG | RESPIRATION RATE: 17 BRPM | TEMPERATURE: 97.7 F
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
## 2025-03-18 DIAGNOSIS — R93.7 ABNORMAL CT SCAN, CERVICAL SPINE: Primary | ICD-10-CM

## 2025-03-18 LAB
ALBUMIN SERPL BCP-MCNC: 4.1 G/DL (ref 3.4–5)
ALP SERPL-CCNC: 118 U/L (ref 33–110)
ALT SERPL W P-5'-P-CCNC: 12 U/L (ref 7–45)
ANION GAP SERPL CALC-SCNC: 12 MMOL/L (ref 10–20)
AST SERPL W P-5'-P-CCNC: 15 U/L (ref 9–39)
BASOPHILS # BLD AUTO: 0.03 X10*3/UL (ref 0–0.1)
BASOPHILS NFR BLD AUTO: 0.8 %
BILIRUB SERPL-MCNC: 0.5 MG/DL (ref 0–1.2)
BUN SERPL-MCNC: 9 MG/DL (ref 6–23)
CALCIUM SERPL-MCNC: 9.1 MG/DL (ref 8.6–10.6)
CHLORIDE SERPL-SCNC: 109 MMOL/L (ref 98–107)
CO2 SERPL-SCNC: 26 MMOL/L (ref 21–32)
CREAT SERPL-MCNC: 0.53 MG/DL (ref 0.5–1.05)
CRP SERPL-MCNC: 0.37 MG/DL
EGFRCR SERPLBLD CKD-EPI 2021: >90 ML/MIN/1.73M*2
EOSINOPHIL # BLD AUTO: 0.1 X10*3/UL (ref 0–0.7)
EOSINOPHIL NFR BLD AUTO: 2.6 %
ERYTHROCYTE [DISTWIDTH] IN BLOOD BY AUTOMATED COUNT: 12.6 % (ref 11.5–14.5)
ERYTHROCYTE [SEDIMENTATION RATE] IN BLOOD BY WESTERGREN METHOD: 23 MM/H (ref 0–30)
GLUCOSE SERPL-MCNC: 81 MG/DL (ref 74–99)
HCT VFR BLD AUTO: 36.4 % (ref 36–46)
HGB BLD-MCNC: 12.3 G/DL (ref 12–16)
IMM GRANULOCYTES # BLD AUTO: 0 X10*3/UL (ref 0–0.7)
IMM GRANULOCYTES NFR BLD AUTO: 0 % (ref 0–0.9)
LYMPHOCYTES # BLD AUTO: 2.18 X10*3/UL (ref 1.2–4.8)
LYMPHOCYTES NFR BLD AUTO: 55.9 %
MCH RBC QN AUTO: 27.8 PG (ref 26–34)
MCHC RBC AUTO-ENTMCNC: 33.8 G/DL (ref 32–36)
MCV RBC AUTO: 82 FL (ref 80–100)
MONOCYTES # BLD AUTO: 0.29 X10*3/UL (ref 0.1–1)
MONOCYTES NFR BLD AUTO: 7.4 %
NEUTROPHILS # BLD AUTO: 1.3 X10*3/UL (ref 1.2–7.7)
NEUTROPHILS NFR BLD AUTO: 33.3 %
NRBC BLD-RTO: 0 /100 WBCS (ref 0–0)
PLATELET # BLD AUTO: 222 X10*3/UL (ref 150–450)
POTASSIUM SERPL-SCNC: 3.7 MMOL/L (ref 3.5–5.3)
PROT SERPL-MCNC: 6.7 G/DL (ref 6.4–8.2)
RBC # BLD AUTO: 4.43 X10*6/UL (ref 4–5.2)
SODIUM SERPL-SCNC: 143 MMOL/L (ref 136–145)
WBC # BLD AUTO: 3.9 X10*3/UL (ref 4.4–11.3)

## 2025-03-18 PROCEDURE — 70450 CT HEAD/BRAIN W/O DYE: CPT | Performed by: RADIOLOGY

## 2025-03-18 PROCEDURE — 70450 CT HEAD/BRAIN W/O DYE: CPT

## 2025-03-18 PROCEDURE — 2500000005 HC RX 250 GENERAL PHARMACY W/O HCPCS: Mod: SE | Performed by: NURSE PRACTITIONER

## 2025-03-18 PROCEDURE — 85652 RBC SED RATE AUTOMATED: CPT | Performed by: NURSE PRACTITIONER

## 2025-03-18 PROCEDURE — 86140 C-REACTIVE PROTEIN: CPT | Performed by: NURSE PRACTITIONER

## 2025-03-18 PROCEDURE — 96374 THER/PROPH/DIAG INJ IV PUSH: CPT

## 2025-03-18 PROCEDURE — 72125 CT NECK SPINE W/O DYE: CPT | Performed by: RADIOLOGY

## 2025-03-18 PROCEDURE — 85025 COMPLETE CBC W/AUTO DIFF WBC: CPT | Performed by: NURSE PRACTITIONER

## 2025-03-18 PROCEDURE — 72125 CT NECK SPINE W/O DYE: CPT

## 2025-03-18 PROCEDURE — 36415 COLL VENOUS BLD VENIPUNCTURE: CPT | Performed by: NURSE PRACTITIONER

## 2025-03-18 PROCEDURE — 99285 EMERGENCY DEPT VISIT HI MDM: CPT | Performed by: NURSE PRACTITIONER

## 2025-03-18 PROCEDURE — 2500000004 HC RX 250 GENERAL PHARMACY W/ HCPCS (ALT 636 FOR OP/ED): Performed by: NURSE PRACTITIONER

## 2025-03-18 PROCEDURE — 80053 COMPREHEN METABOLIC PANEL: CPT | Performed by: NURSE PRACTITIONER

## 2025-03-18 PROCEDURE — 99284 EMERGENCY DEPT VISIT MOD MDM: CPT | Mod: 25

## 2025-03-18 RX ORDER — PREDNISONE 20 MG/1
20 TABLET ORAL DAILY
Qty: 7 TABLET | Refills: 0 | Status: SHIPPED | OUTPATIENT
Start: 2025-03-18 | End: 2025-03-25

## 2025-03-18 RX ORDER — METHOCARBAMOL 500 MG/1
500 TABLET, FILM COATED ORAL EVERY 8 HOURS PRN
Qty: 15 TABLET | Refills: 0 | Status: SHIPPED | OUTPATIENT
Start: 2025-03-18 | End: 2025-03-26 | Stop reason: SDUPTHER

## 2025-03-18 RX ORDER — LIDOCAINE 560 MG/1
1 PATCH PERCUTANEOUS; TOPICAL; TRANSDERMAL DAILY
Status: DISCONTINUED | OUTPATIENT
Start: 2025-03-18 | End: 2025-03-18 | Stop reason: HOSPADM

## 2025-03-18 RX ORDER — ORPHENADRINE CITRATE 30 MG/ML
60 INJECTION INTRAMUSCULAR; INTRAVENOUS ONCE
Status: COMPLETED | OUTPATIENT
Start: 2025-03-18 | End: 2025-03-18

## 2025-03-18 RX ORDER — LIDOCAINE 50 MG/G
1 PATCH TOPICAL DAILY
Qty: 7 PATCH | Refills: 0 | Status: SHIPPED | OUTPATIENT
Start: 2025-03-18

## 2025-03-18 RX ADMIN — LIDOCAINE 1 PATCH: 4 PATCH TOPICAL at 11:26

## 2025-03-18 RX ADMIN — ORPHENADRINE CITRATE 60 MG: 60 INJECTION INTRAMUSCULAR; INTRAVENOUS at 14:18

## 2025-03-18 ASSESSMENT — COLUMBIA-SUICIDE SEVERITY RATING SCALE - C-SSRS
2. HAVE YOU ACTUALLY HAD ANY THOUGHTS OF KILLING YOURSELF?: NO
1. IN THE PAST MONTH, HAVE YOU WISHED YOU WERE DEAD OR WISHED YOU COULD GO TO SLEEP AND NOT WAKE UP?: NO
6. HAVE YOU EVER DONE ANYTHING, STARTED TO DO ANYTHING, OR PREPARED TO DO ANYTHING TO END YOUR LIFE?: NO

## 2025-03-18 ASSESSMENT — ENCOUNTER SYMPTOMS: HEADACHES: 1

## 2025-03-18 NOTE — ED PROVIDER NOTES
Emergency Department Encounter  [facility] EMERGENCY MEDICINE    Patient: Caron Hobson  MRN: [id number]  : 1965  Date of Evaluation: 3/18/2025  ED Provider: MIMA Stroud      Chief Complaint       Chief Complaint   Patient presents with    Fall        Limitations to History: none  Historian: patient  Records reviewed: EMR inpatient and outpatient notes, Care Everywhere    This is a 59-year-old female with a PMH of diabetes and hypertension who presents to the emergency room after a fall.  Patient states that she fell down approximately 6 stairs 1 week ago.  Patient states that the stairs leading out of her house had snow on them/ice and she slipped and fell.  Patient states that she hit the back of her head.  Denies any loss of consciousness.  Patient states that she has been having persistent headaches and currently rates her pain a 9 out of 10.  Denies taking any over-the-counter medications prior to arrival.  Patient does not take any blood thinners.  Patient describes her pain as a pressure, throbbing constant pain.    PMH: DM, HTN  PSH: neck surgery  Allergies: NKDA  Social HX: Denies smoking, alcohol or drug use.  Family HX: No family history pertinent to current presenting problem  Medications: Reviewed per EMR    ROS:     Review of Systems   Neurological:  Positive for headaches.     14 systems reviewed and otherwise acutely negative except as in the [place].        Past History     Past Medical History:   Diagnosis Date    Arthritis     Awareness under anesthesia     Cervical radiculopathy     Hyperlipidemia     Hypertension     Morbid (severe) obesity due to excess calories (Multi)     Nephrolithiasis     Sleep apnea     Spinal stenosis     Type 2 diabetes mellitus      Past Surgical History:   Procedure Laterality Date    APPENDECTOMY      CERVICAL FUSION  2021    C1-2 fusion    CHOLECYSTECTOMY      CT ANGIO NECK  2021    CT ANGIO NECK 2021     ESOPHAGOGASTRODUODENOSCOPY      GASTRIC BYPASS N/A     Gastric Surgery For Morbid Obesity Bypass With Yasmany-en-Y 01/24/2018    MR NECK ANGIO WO IV CONTRAST  02/01/2021    MR NECK ANGIO WO IV CONTRAST 2/1/2021 [facility] CLINICAL LEGACY    OTHER SURGICAL HISTORY N/A 02/23/2021    Cervical vertebral fusion 02/23/2021    OTHER SURGICAL HISTORY  01/03/2018    Incision And Drainage Of Skin Abscess, Simple    TOTAL KNEE ARTHROPLASTY           Medications/Allergies     Previous Medications    ADVAIR DISKUS 250-50 MCG/DOSE DISKUS INHALER        ALBUTEROL 90 MCG/ACTUATION INHALER        AMITRIPTYLINE (ELAVIL) 50 MG TABLET    Take 1 tablet (50 mg) by mouth.    ARMODAFINIL (NUVIGIL) 150 MG TABLET    Take 1 tablet (150 mg) by mouth once daily.    ATORVASTATIN (LIPITOR) 40 MG TABLET    Take 1 tablet (40 mg) by mouth.    CHLORHEXIDINE (PERIDEX) 0.12 % SOLUTION    Use 15 mL in the mouth or throat see administration instructions for 2 doses. Use the night before and morning of surgery.    CHOLECALCIFEROL (VITAMIN D-3) 50,000 UNIT CAPSULE    Take by mouth.    DICLOFENAC SODIUM (VOLTAREN) 1 % GEL    APPLY 2 GRAMS TOPICALLY TO THE AFFECTED AREA FOUR TIMES DAILY    FERROUS SULFATE, 325 MG FERROUS SULFATE, TABLET    Take 1 tablet by mouth.    GABAPENTIN (NEURONTIN) 300 MG CAPSULE    Take 1 capsule (300 mg) by mouth 2 times a day.    LANCETS MISC    2 times a day.    LIDOCAINE (LIDODERM) 5 % PATCH    Place 1 patch over 12 hours on the skin once daily. Remove & discard patch within 12 hours or as directed by MD.    METHOCARBAMOL (ROBAXIN) 500 MG TABLET    Take 2 tablets (1,000 mg) by mouth every 8 hours.    OZEMPIC 0.25 MG OR 0.5 MG (2 MG/3 ML) PEN INJECTOR    INJECT 0.25MG UNDER THE SKIN ONCE WEEKLY    POVIDONE-IODINE (BETADINE) 10 % TOPICAL SOLUTION    Apply to incision twice daily and cover with dry gauze dressing for one week     Allergies   Allergen Reactions    Coconut GI Upset and Hives    Lactose Diarrhea    Other Unknown        Physical  Exam       ED Triage Vitals [03/18/25 1036]   Temperature Heart Rate Respirations BP   36.5 °C (97.7 °F) 78 16 142/79      Pulse Ox Temp Source Heart Rate Source Patient Position   99 % Temporal -- --      BP Location FiO2 (%)     -- --       Physical Exam:    Appearance: Alert, oriented , cooperative,  in no acute distress. Well nourished & well hydrated.    Skin: Intact,  dry skin, no lesions, rash, petechiae or purpura.     Eyes: PERRLA, EOMs intact.    ENT: Hearing grossly intact. External auditory canals patent, tympanic membranes intact with visible landmarks. Nares patent, mucus membranes moist. Dentition without lesions. Pharynx clear, uvula midline.     Neck: Supple, without meningismus.     Pulmonary: Clear bilaterally with good chest wall excursion. No rales, rhonchi or wheezing. No accessory muscle use or stridor.    Cardiac: Normal S1, S2 without murmur, rub, gallop or extrasystole. No JVD, Carotids without bruits.    Abdomen: Soft, nontender, active bowel sounds.  No palpable organomegaly.  No rebound or guarding.     Musculoskeletal: Full range of motion. no pain, edema, or deformity. Pulses full and equal. No cyanosis, clubbing, or edema.    Neurological:  Normal sensation, no weakness, no focal findings identified.    Psychiatric: Appropriate mood and affect.       Diagnostics   Labs:  Results for orders placed or performed during the hospital encounter of 03/18/25 (from the past 24 hours)   Sedimentation rate, automated   Result Value Ref Range    Sedimentation Rate 23 0 - 30 mm/h      Radiographs:  CT head wo IV contrast   Final Result   CT head:        No acute intracranial abnormality or mass effect.        CT cervical spine:        1. No acute fracture or traumatic malalignment.        2. Postoperative changes within the cervical spine as detailed above.   There is lucency within the screws located within the left C1 lateral   mass and the right occipital condyle.        3. Multilevel  degenerative changes of the cervical spine as detailed   above.        This study was interpreted at [facility].        MACRO:   None        Signed by: Tyrese Santiago 3/18/2025 12:25 PM   Dictation workstation:   [id number]      CT cervical spine wo IV contrast   Final Result   CT head:        No acute intracranial abnormality or mass effect.        CT cervical spine:        1. No acute fracture or traumatic malalignment.        2. Postoperative changes within the cervical spine as detailed above.   There is lucency within the screws located within the left C1 lateral   mass and the right occipital condyle.        3. Multilevel degenerative changes of the cervical spine as detailed   above.        This study was interpreted at [facility].        MACRO:   None        Signed by: Tyrese Santiago 3/18/2025 12:25 PM   Dictation workstation:   [id number]              Assessment   In brief, Caron Hobson is a 59 y.o. female who presented to the emergency department with a headache and neck pain after a fall one week ago.          ED Course/MDM     Diagnoses as of 03/18/25 1507   Abnormal CT scan, cervical spine   Fall, initial encounter      Visit Vitals  /79   Pulse 78   Temp 36.5 °C (97.7 °F) (Temporal)   Resp 16   SpO2 99%   OB Status Postmenopausal   Smoking Status Never       Medications   lidocaine 4 % patch 1 patch (1 patch transdermal Medication Applied 3/18/25 1126)   orphenadrine (Norflex) injection 60 mg (60 mg intravenous Given 3/18/25 1418)       Patient remained stable while in the emergency department. Previous outpatient and ED records were reviewed. Outside records were reviewed.  CT head and C-spine was obtained.  CT head showed no acute intracranial abnormality or mass effect.  CT cervical spine shows no acute fracture or traumatic malalignment.  Postoperative changes within the cervical spine as detailed above.  There is a  lucency within the screws located within the left C1 lateral mass and the right occipital condyle.  Orthospine was consulted, please see orthospine consult note for complete details.  Patient received a lidocaine patch for pain and Norflex 60 mg IV once.  Patient declined oral pain medications while in the emergency room.  Initially orthospine was requesting lab work which was ordered and then they state the patient can be discharged home for outpatient follow-up.  Lab results are still pending at this time but patient was requesting to be discharged.  Patient was discharged home, advised to follow-up with orthospine and return the emergency room with worsening symptoms.  Patient was prescribed lidocaine patches, Robaxin and prednisone for 7 days.  Patient was advised that prednisone may increase her blood sugar.  Patient was advised to check her blood sugar frequently and return to the emergency department with worsening symptoms.    Final Impression      1. Abnormal CT scan, cervical spine    2. Fall, initial encounter          DISPOSITION  Disposition: Discharged home    Comment: Please note this report has been produced using speech recognition software and may contain errors related to that system including errors in grammar, punctuation, and spelling, as well as words and phrases that may be inappropriate.  If there are any questions or concerns please feel free to contact the dictating provider for clarification.    GLORIA Stroud-GLORIA Bruno-VANIA  03/18/25 1680

## 2025-03-18 NOTE — CONSULTS
Orthopaedic Surgery Consult H&P    HPI:   Orthopaedic Problems/Injuries: c/f hardware loosening from prior C1-4 fusion  Other Injuries: N/A    59F (T2DM, HTN, s/p C1-C4 fusion in 2001, s/p C5-6 disc arthroplasty 7/2/24 w/ Dr. Coe) p/a after fall down 6 stairs 1 week ago w/ headache and neck stiffness. Denies myelopathic signs, S/A, radicular sx. No UMN. Patient denies bowel/bladder incontinence. No dense numbness or weakness     PMH: per above/EMR  PSH: per above/EMR  SocHx:      -  Denies tobacco use      -  Denies EtOH use      -  Denies other drug use  FamHx:  Non-contributory to this patient's acute orthopaedic problem.   Allergies: Reviewed in EMR  Meds: Reviewed in EMR    ROS      - 14 point ROS negative except as above    Physical Exam:  Gen: AOx3, NAD  HEENT: normocephalic atraumatic  Psych: appropriate mood and affect  Resp: nonlabored breathing  Cardiac: Extremities WWP, RRR to peripheral palpation  Neuro: CN 2-12 grossly intact  Skin: no rashes. Well healed surgical scar over posterior aspect of cervical spine.     Spine Exam:  Mild TTP about cervical paraspinals.     C5: SILT   Deltoid 5/5 Left; 5/5 Right  C6: SILT   Wrist Ext: 5/5 Left; 5/5 Right  C7: SILT   Triceps: 5/5 Left; 5/5 Right  C8: SILT   Finger flexion: 5/5 Left; 5/5 Right  T1: SILT    Interossei: 5/5 Left; 5/5 Right    L1: SILT       L2: SILT      Hip flexors 5/5 Left; 5/5 Right  L3: SILT      Knee extension 5/5 Left; 5/5 Right  L4: SILT      Tib Ant. (Dorsiflexion) 5/5 Left; 5/5 Right  L5: SILT      EHL 5/5 Left; 5/5 Right  S1: SILT      Plantarflexion 5/5 Left; 5/5 Right    Babinkski: Intact    A full secondary exam was performed and all relevant findings discussed and noted above.    Imaging:  CT head wo IV contrast   Final Result   CT head:        No acute intracranial abnormality or mass effect.        CT cervical spine:        1. No acute fracture or traumatic malalignment.        2. Postoperative changes within the cervical spine  as detailed above.   There is lucency within the screws located within the left C1 lateral   mass and the right occipital condyle.        3. Multilevel degenerative changes of the cervical spine as detailed   above.        This study was interpreted at [facility].        MACRO:   None        Signed by: Tyrese Santiago 3/18/2025 12:25 PM   Dictation workstation:   [id number]      CT cervical spine wo IV contrast   Final Result   CT head:        No acute intracranial abnormality or mass effect.        CT cervical spine:        1. No acute fracture or traumatic malalignment.        2. Postoperative changes within the cervical spine as detailed above.   There is lucency within the screws located within the left C1 lateral   mass and the right occipital condyle.        3. Multilevel degenerative changes of the cervical spine as detailed   above.        This study was interpreted at [facility].        MACRO:   None        Signed by: Tyrese Santiago 3/18/2025 12:25 PM   Dictation workstation:   [id number]          Assessment:  Orthopaedic Problems/Injuries: c/f hardware loosening    59F (s/p C1-C4 fusion in 2001, s/p C5-6 disc arthroplasty 7/2/24 w/ Dr. Coe) p/a after fall down 6 stairs 1 week ago w/ headache and neck stiffness.. Denies myelopathic signs, B/B, S/A, radicular sx. No UMN. 5/5 strength BLUE, BLLE. SILT. CT w/ c/f lucency about C1 screw. ESR 23. Clinically consistent with muscle spasms/neck strain. Findings on imaging more likely related to chronic process, does not need to be addressed acutely.     Plan:  Recommendations:  - No acute orthopaedic surgical intervention indicated at this time.  - Antibiotics:  No indication for ABx from an orthopedic perspective  - Pain management per ED    - Okay to discharge from ED from an orthopedic perspective   - Recommend short course of oral Prednisone, 20 mg x 7 days and Robaxin   - Please page  with any questions/concerns.    Patient should follow-up with Sharon Angel PA-C 1 week after discharge. Appointments can be made by calling [phone].     This patient was staffed with Dr. Coe.     David Lopez MD  Orthopaedic Surgery PGY1  [facility]  [employer]

## 2025-03-24 DIAGNOSIS — M54.12 CERVICAL RADICULITIS: ICD-10-CM

## 2025-03-26 ENCOUNTER — OFFICE VISIT (OUTPATIENT)
Dept: ORTHOPEDIC SURGERY | Facility: CLINIC | Age: 60
End: 2025-03-26
Payer: COMMERCIAL

## 2025-03-26 ENCOUNTER — HOSPITAL ENCOUNTER (OUTPATIENT)
Dept: RADIOLOGY | Facility: CLINIC | Age: 60
Discharge: HOME | End: 2025-03-26
Payer: COMMERCIAL

## 2025-03-26 VITALS — WEIGHT: 178 LBS | BODY MASS INDEX: 33.61 KG/M2 | HEIGHT: 61 IN

## 2025-03-26 DIAGNOSIS — M54.2 CHRONIC NECK PAIN: ICD-10-CM

## 2025-03-26 DIAGNOSIS — M54.12 CERVICAL RADICULITIS: ICD-10-CM
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
## 2025-03-26 DIAGNOSIS — G89.29 CHRONIC NECK PAIN: ICD-10-CM

## 2025-03-26 PROCEDURE — 72040 X-RAY EXAM NECK SPINE 2-3 VW: CPT

## 2025-03-26 PROCEDURE — 3008F BODY MASS INDEX DOCD: CPT | Performed by: PHYSICIAN ASSISTANT

## 2025-03-26 PROCEDURE — 99213 OFFICE O/P EST LOW 20 MIN: CPT | Performed by: PHYSICIAN ASSISTANT

## 2025-03-26 PROCEDURE — 72040 X-RAY EXAM NECK SPINE 2-3 VW: CPT | Performed by: RADIOLOGY

## 2025-03-26 PROCEDURE — 1036F TOBACCO NON-USER: CPT | Performed by: PHYSICIAN ASSISTANT

## 2025-03-26 RX ORDER — METHOCARBAMOL 500 MG/1
500 TABLET, FILM COATED ORAL EVERY 8 HOURS PRN
Qty: 60 TABLET | Refills: 2 | Status: SHIPPED | OUTPATIENT
Start: 2025-03-26

## 2025-03-26 ASSESSMENT — PAIN - FUNCTIONAL ASSESSMENT: PAIN_FUNCTIONAL_ASSESSMENT: 0-10

## 2025-03-26 NOTE — PROGRESS NOTES
Caron is over 8 months postop from a C5-6 disc arthroplasty performed by Dr. Coe on 7/2.    She missed her 3-month postop visit and several other appointments.  She returns clinic today with increased neck pain and stiffness.  She mentions a fall a couple weeks ago, she slipped on the ice and fell down 6 cement stairs.  She thinks majority of her neck pain is coming from her previous posterior cervical fusion.  Especially when it is cold outside she needs her neck freezes and she is unable to move her head.  She does not have pain radiating down her arms.  She is ambulating without difficulty.    On exam, normal gait without assistive devices.  Full strength of the upper extremities bilaterally.  Incision well-healed.    I personally reviewed x-rays of the cervical spine completed today.  There is no evidence of acute fracture or implant failure.  Stable alignment of disc arthroplasty.  I also reviewed a CT scan of the cervical spine completed on 3/18.  There is mild lucency surrounding the C1 screws.  C3 and C4 screws show no evidence of loosening.  Overall there is stable alignment of this posterior fusion.    She can continue to increase her activities as tolerated without restrictions.  The majority of her symptoms sound like muscular neck pain.  I recommended physical therapy, she is not interested, she states she is already done this.  She was given a referral to try some massage therapy.  A refill of methocarbamol was sent to her pharmacy for muscle spasms.  She is unable to take NSAIDs due to history of gastric bypass.  I am optimistic her symptoms will improve with conservative treatment.  She can follow-up with me on an as-needed basis.    **This note was dictated using speech recognition software and was not corrected for spelling or grammatical errors**

## 2025-04-04 ENCOUNTER — TELEPHONE (OUTPATIENT)
Dept: PLASTIC SURGERY | Facility: CLINIC | Age: 60
End: 2025-04-04
Payer: COMMERCIAL

## 2025-04-04 NOTE — TELEPHONE ENCOUNTER
"RN reached out to Caron Hobson  regarding request for panniculectomy surgery consult. They are agreeable to answering triage questions at this time.    General Health Questions:  Height? 5'1\"  Weight? 178  BMI? 33  Has your weight been stable the past 6 months? yes  What did you do to achieve your weight loss goals? Wt loss surgery four years ago  Do you smoke? No   Do you have any medical conditions like HTN or diabetes? no    Panniculectomy Questions:  Do you have any skin conditions due to the overhanging skin? Occasional irritation  Have you had RX treatment within the past three months? No      I reviewed with Caron Hobson the insurance requirements for a panniculectomy. I recommended that next time she has a skin irritation or rash to reach out to her PCP or OBGYN for RX treatment and then schedule a follow up with them to document the treatment.    Offered patient a cosmetic consult but she declined at this time since she does experience rashes although nothing current.    Encouraged patient to contact our office once there is documented failure of treatment from a secondary provider.    "

## 2025-04-10 ENCOUNTER — APPOINTMENT (OUTPATIENT)
Dept: PLASTIC SURGERY | Facility: CLINIC | Age: 60
End: 2025-04-10
Payer: COMMERCIAL

## 2025-06-16 ENCOUNTER — APPOINTMENT (OUTPATIENT)
Dept: OPHTHALMOLOGY | Facility: CLINIC | Age: 60
End: 2025-06-16
Payer: COMMERCIAL

## (undated) DEVICE — COVER, PLASTIC, MAYO STAND, 29.5IN X 55.5IN

## (undated) DEVICE — SEALER, BIPOLAR, AQUA MANTYS 6.0

## (undated) DEVICE — Device

## (undated) DEVICE — ELECTRODE, ELECTROSURGICAL, BLADE, EXTENDED, 6.5 IN, STAINLESS STEEL

## (undated) DEVICE — APPLICATOR, PREP, CHLORAPREP, W/ORANGE TINT, 10.5ML

## (undated) DEVICE — CLOSURE SYSTEM, DERMABOND, PRINEO, 22CM, STERILE

## (undated) DEVICE — SPONGE, DISSECTOR, PEANUT, 3/8, STERILE 5 FOAM HOLDER"

## (undated) DEVICE — SUTURE, VICRYL, 3-0, 18 IN, PS2, UNDYED

## (undated) DEVICE — DRAPE, TOWEL, STERI DRAPE, 17 X 11 IN, PLASTIC, STERILE

## (undated) DEVICE — SUTURE, SILK, 2-0, 18 IN, BLACK

## (undated) DEVICE — TUBING, SUCTION, CONNECTING, STERILE 0.25 X 120 IN., LF

## (undated) DEVICE — DRAPE, INSTRUMENT, W/POUCH, STERI DRAPE, 7 X 11 IN, DISPOSABLE, STERILE

## (undated) DEVICE — TOWEL, SURGICAL, NEURO, O/R, 16 X 26, BLUE, STERILE

## (undated) DEVICE — MANIFOLD, 4 PORT NEPTUNE STANDARD

## (undated) DEVICE — BOWL, BASIN, 32 OZ, STERILE

## (undated) DEVICE — CORD, BIPOLAR,  12 FT, DISPOSABLE, LF

## (undated) DEVICE — DRAPE, INCISE, ANTIMICROBIAL, IOBAN 2, STERI DRAPE, 23 X 33 IN, DISPOSABLE, STERILE

## (undated) DEVICE — FLOSEAL, MATRIX, HEMOSTATIC, FULL STERILE PREP, 5ML

## (undated) DEVICE — DRAPE, SHEET, FAN FOLDED, HALF, 44 X 58 IN, DISPOSABLE, LF, STERILE

## (undated) DEVICE — APPLICATOR, CHLORAPREP, W/ORANGE TINT, 26ML

## (undated) DEVICE — SUTURE, STRATAFIX, SPIRAL MONOCRYL PLUS, 3-0, PS-2 45CM, UNDYED

## (undated) DEVICE — DRAIN, WOUND, FLAT, HUBLESS, FULL LENGTH PERFORATION, 7 MM X 20 CM

## (undated) DEVICE — SUTURE, POLYSORB, 4-0, 18 IN, P12, UNDYED

## (undated) DEVICE — SUTURE, MONOCRYL, 3-0, PS-1 27IN, UNDYED

## (undated) DEVICE — SPONGE, HEMOSTATIC, GELATIN, SURGIFOAM, 8 X 12.5 CM X 10 MM

## (undated) DEVICE — DRAPE, TIBURON W/ADHESIVE, 19 X 30

## (undated) DEVICE — COVER, TABLE, UHC

## (undated) DEVICE — PIN, SKULL, MAYFIELD, ADULT

## (undated) DEVICE — DRESSING, SPONGE, GAUZE, CURITY, 4 X 4 IN, STERILE

## (undated) DEVICE — EVACUATOR, WOUND, SUCTION, CLOSED, JACKSON-PRATT, 100 CC, SILICONE

## (undated) DEVICE — SUTURE, POLYSORB, 3-0, 18 IN, P12, UNDYED

## (undated) DEVICE — DRAPE, MICROSCOPE, FOR ZEISS 65MM, VARI-LENS II, 52 X 150

## (undated) DEVICE — SUTURE, ETHILON, 2-0, FSLX 30, BLACK

## (undated) DEVICE — ELECTRODE, ELECTROSURGICAL, BLADE, INSULATED, ENT/IMA, STERILE

## (undated) DEVICE — SUTURE, VICRYL, 2-0, 27 IN, FSL, UNDYED

## (undated) DEVICE — CLEANER, ELECTROSURGICAL, TIP, 5 X 5 CM, LF

## (undated) DEVICE — CATHETER TRAY, SURESTEP, 16FR, URINE METER W/STATLOCK

## (undated) DEVICE — PAD, GROUNDING, ELECTROSURGICAL, W/9 FT CABLE, POLYHESIVE II, ADULT, LF

## (undated) DEVICE — COVER, LIGHT HANDLE, SURGICAL, FLEXIBLE, DISPOSABLE, STERILE

## (undated) DEVICE — COVER, CART, 45 X 27 X 48 IN, CLEAR

## (undated) DEVICE — REST, HEAD, BAGEL, 9 IN

## (undated) DEVICE — CAUTERY, PENCIL, PUSH BUTTON, SMOKE EVAC, 70MM